# Patient Record
Sex: FEMALE | Race: WHITE | URBAN - NONMETROPOLITAN AREA
[De-identification: names, ages, dates, MRNs, and addresses within clinical notes are randomized per-mention and may not be internally consistent; named-entity substitution may affect disease eponyms.]

---

## 2017-01-13 PROBLEM — R07.9 CHEST PAIN: Status: ACTIVE | Noted: 2017-01-13

## 2017-01-13 PROBLEM — G47.30 SLEEP APNEA: Status: ACTIVE | Noted: 2017-01-13

## 2017-01-13 PROBLEM — B35.4 TINEA CORPORIS: Status: ACTIVE | Noted: 2017-01-13

## 2017-01-13 PROBLEM — M43.6 TORTICOLLIS: Status: ACTIVE | Noted: 2017-01-13

## 2017-01-13 PROBLEM — I10 HYPERTENSION: Status: ACTIVE | Noted: 2017-01-13

## 2017-01-13 PROBLEM — M19.90 OSTEOARTHRITIS: Status: ACTIVE | Noted: 2017-01-13

## 2017-01-13 RX ORDER — OMEPRAZOLE 20 MG/1
20 CAPSULE, DELAYED RELEASE ORAL DAILY
COMMUNITY

## 2017-01-13 RX ORDER — MELOXICAM 15 MG/1
15 TABLET ORAL DAILY
COMMUNITY

## 2017-01-13 RX ORDER — LISINOPRIL AND HYDROCHLOROTHIAZIDE 25; 20 MG/1; MG/1
1 TABLET ORAL DAILY
COMMUNITY

## 2017-01-13 RX ORDER — OXYBUTYNIN CHLORIDE 5 MG/1
5 TABLET ORAL 2 TIMES DAILY
COMMUNITY

## 2017-01-13 RX ORDER — LEVOTHYROXINE SODIUM 0.05 MG/1
50 TABLET ORAL DAILY
COMMUNITY

## 2017-01-13 RX ORDER — SIMVASTATIN 40 MG
40 TABLET ORAL DAILY
COMMUNITY

## 2017-01-13 RX ORDER — TOLTERODINE TARTRATE 1 MG/1
1 TABLET, EXTENDED RELEASE ORAL
COMMUNITY

## 2017-02-28 ENCOUNTER — OFFICE VISIT (OUTPATIENT)
Dept: UROLOGY | Age: 57
End: 2017-02-28
Payer: MEDICARE

## 2017-02-28 VITALS
DIASTOLIC BLOOD PRESSURE: 70 MMHG | HEIGHT: 63 IN | TEMPERATURE: 97.3 F | OXYGEN SATURATION: 98 % | WEIGHT: 178 LBS | HEART RATE: 66 BPM | BODY MASS INDEX: 31.54 KG/M2 | SYSTOLIC BLOOD PRESSURE: 130 MMHG

## 2017-02-28 DIAGNOSIS — N39.3 STRESS INCONTINENCE: ICD-10-CM

## 2017-02-28 DIAGNOSIS — N39.0 ACUTE UTI (URINARY TRACT INFECTION): Primary | ICD-10-CM

## 2017-02-28 LAB
BILIRUBIN, POC: NORMAL
BLOOD URINE, POC: NORMAL
CLARITY, POC: NORMAL
COLOR, POC: NORMAL
GLUCOSE URINE, POC: NORMAL
KETONES, POC: NORMAL
LEUKOCYTE EST, POC: NORMAL
NITRITE, POC: NORMAL
PH, POC: 7
PROTEIN, POC: NORMAL
SPECIFIC GRAVITY, POC: 1.01
UROBILINOGEN, POC: 0.2

## 2017-02-28 PROCEDURE — G8419 CALC BMI OUT NRM PARAM NOF/U: HCPCS | Performed by: PHYSICIAN ASSISTANT

## 2017-02-28 PROCEDURE — 3017F COLORECTAL CA SCREEN DOC REV: CPT | Performed by: PHYSICIAN ASSISTANT

## 2017-02-28 PROCEDURE — 4004F PT TOBACCO SCREEN RCVD TLK: CPT | Performed by: PHYSICIAN ASSISTANT

## 2017-02-28 PROCEDURE — 81002 URINALYSIS NONAUTO W/O SCOPE: CPT | Performed by: PHYSICIAN ASSISTANT

## 2017-02-28 PROCEDURE — 51798 US URINE CAPACITY MEASURE: CPT | Performed by: PHYSICIAN ASSISTANT

## 2017-02-28 PROCEDURE — 99214 OFFICE O/P EST MOD 30 MIN: CPT | Performed by: PHYSICIAN ASSISTANT

## 2017-02-28 PROCEDURE — 3014F SCREEN MAMMO DOC REV: CPT | Performed by: PHYSICIAN ASSISTANT

## 2017-02-28 PROCEDURE — G8427 DOCREV CUR MEDS BY ELIG CLIN: HCPCS | Performed by: PHYSICIAN ASSISTANT

## 2017-02-28 PROCEDURE — G8484 FLU IMMUNIZE NO ADMIN: HCPCS | Performed by: PHYSICIAN ASSISTANT

## 2017-02-28 ASSESSMENT — ENCOUNTER SYMPTOMS
HEMOPTYSIS: 0
SPUTUM PRODUCTION: 0
DOUBLE VISION: 0
VOMITING: 0
PHOTOPHOBIA: 0
BACK PAIN: 0
ORTHOPNEA: 0
ABDOMINAL PAIN: 0

## 2025-07-01 RX ORDER — BLOOD SUGAR DIAGNOSTIC
1 STRIP MISCELLANEOUS SEE ADMIN INSTRUCTIONS
COMMUNITY
Start: 2025-03-10

## 2025-07-01 RX ORDER — LEVOFLOXACIN 250 MG/1
TABLET, FILM COATED ORAL
COMMUNITY
Start: 2025-03-21

## 2025-07-01 RX ORDER — CHLORAL HYDRATE 500 MG
1 CAPSULE ORAL 3 TIMES DAILY
COMMUNITY

## 2025-07-01 RX ORDER — CHOLECALCIFEROL (VITAMIN D3) 25 MCG
25 TABLET ORAL DAILY
COMMUNITY

## 2025-07-01 RX ORDER — LANCETS
EACH MISCELLANEOUS
COMMUNITY
Start: 2025-05-17

## 2025-07-01 RX ORDER — AMLODIPINE BESYLATE 2.5 MG/1
2.5 TABLET ORAL DAILY
COMMUNITY
Start: 2025-06-12

## 2025-07-01 RX ORDER — PREDNISONE 20 MG/1
20 TABLET ORAL DAILY
COMMUNITY

## 2025-07-01 RX ORDER — HYDROCHLOROTHIAZIDE 25 MG/1
25 TABLET ORAL DAILY
COMMUNITY
Start: 2025-06-12

## 2025-07-01 RX ORDER — CEFDINIR 300 MG/1
300 CAPSULE ORAL 2 TIMES DAILY
COMMUNITY

## 2025-07-01 RX ORDER — PANTOPRAZOLE SODIUM 40 MG/1
40 TABLET, DELAYED RELEASE ORAL DAILY
COMMUNITY
Start: 2025-06-04

## 2025-07-01 RX ORDER — PIOGLITAZONE 30 MG/1
30 TABLET ORAL DAILY
COMMUNITY
Start: 2025-01-17 | End: 2025-02-16

## 2025-07-01 RX ORDER — AMITRIPTYLINE HYDROCHLORIDE 10 MG/1
10 TABLET ORAL NIGHTLY
COMMUNITY
Start: 2025-04-25

## 2025-07-01 RX ORDER — METFORMIN HYDROCHLORIDE 500 MG/1
500 TABLET, EXTENDED RELEASE ORAL DAILY
COMMUNITY

## 2025-07-01 RX ORDER — GEMFIBROZIL 600 MG/1
600 TABLET, FILM COATED ORAL 2 TIMES DAILY
COMMUNITY
Start: 2025-06-12

## 2025-07-01 RX ORDER — OMEGA-3S/DHA/EPA/FISH OIL 1000-1400
1 CAPSULE,DELAYED RELEASE (ENTERIC COATED) ORAL DAILY
COMMUNITY

## 2025-07-01 RX ORDER — METFORMIN HYDROCHLORIDE 500 MG/1
500 TABLET, EXTENDED RELEASE ORAL 3 TIMES DAILY
COMMUNITY
Start: 2025-03-31 | End: 2026-04-22

## 2025-07-01 RX ORDER — FLUTICASONE PROPIONATE 50 MCG
2 SPRAY, SUSPENSION (ML) NASAL DAILY
COMMUNITY
End: 2025-07-03

## 2025-07-01 RX ORDER — ASPIRIN 81 MG/1
81 TABLET ORAL DAILY
COMMUNITY

## 2025-07-01 RX ORDER — ALBUTEROL SULFATE 0.83 MG/ML
2.5 SOLUTION RESPIRATORY (INHALATION)
COMMUNITY

## 2025-07-01 RX ORDER — METHOCARBAMOL 750 MG/1
750 TABLET, FILM COATED ORAL 3 TIMES DAILY
COMMUNITY
Start: 2025-04-25

## 2025-07-01 RX ORDER — LACTULOSE 10 G/15ML
10 SOLUTION ORAL
COMMUNITY

## 2025-07-01 RX ORDER — BROMPHENIRAMINE MALEATE, PSEUDOEPHEDRINE HYDROCHLORIDE, AND DEXTROMETHORPHAN HYDROBROMIDE 2; 30; 10 MG/5ML; MG/5ML; MG/5ML
SYRUP ORAL
COMMUNITY
Start: 2025-05-30 | End: 2025-07-03

## 2025-07-01 RX ORDER — ONDANSETRON 4 MG/1
8 TABLET, ORALLY DISINTEGRATING ORAL EVERY 8 HOURS PRN
COMMUNITY

## 2025-07-01 RX ORDER — IPRATROPIUM BROMIDE AND ALBUTEROL SULFATE 2.5; .5 MG/3ML; MG/3ML
3 SOLUTION RESPIRATORY (INHALATION)
COMMUNITY

## 2025-07-01 RX ORDER — FLUTICASONE PROPIONATE 220 UG/1
2 AEROSOL, METERED RESPIRATORY (INHALATION)
COMMUNITY
End: 2025-07-03

## 2025-07-01 RX ORDER — ALBUTEROL SULFATE 90 UG/1
2 INHALANT RESPIRATORY (INHALATION) EVERY 6 HOURS PRN
COMMUNITY

## 2025-07-01 RX ORDER — BLOOD SUGAR DIAGNOSTIC
1 STRIP MISCELLANEOUS DAILY
COMMUNITY
Start: 2025-04-21 | End: 2026-04-22

## 2025-07-01 RX ORDER — GLIPIZIDE 10 MG/1
10 TABLET, FILM COATED, EXTENDED RELEASE ORAL DAILY
COMMUNITY

## 2025-07-01 RX ORDER — MIRTAZAPINE 45 MG/1
45 TABLET, FILM COATED ORAL DAILY
COMMUNITY

## 2025-07-03 ENCOUNTER — OFFICE VISIT (OUTPATIENT)
Dept: PULMONOLOGY | Facility: CLINIC | Age: 65
End: 2025-07-03
Payer: MEDICARE

## 2025-07-03 VITALS
WEIGHT: 150 LBS | OXYGEN SATURATION: 98 % | HEIGHT: 63 IN | SYSTOLIC BLOOD PRESSURE: 147 MMHG | DIASTOLIC BLOOD PRESSURE: 88 MMHG | HEART RATE: 70 BPM | BODY MASS INDEX: 26.58 KG/M2

## 2025-07-03 DIAGNOSIS — Z86.16 HISTORY OF COVID-19: ICD-10-CM

## 2025-07-03 DIAGNOSIS — J30.89 NON-SEASONAL ALLERGIC RHINITIS, UNSPECIFIED TRIGGER: ICD-10-CM

## 2025-07-03 DIAGNOSIS — J44.9 CHRONIC OBSTRUCTIVE PULMONARY DISEASE, UNSPECIFIED COPD TYPE: ICD-10-CM

## 2025-07-03 DIAGNOSIS — R91.1 LUNG NODULE: Primary | ICD-10-CM

## 2025-07-03 DIAGNOSIS — R93.89 ABNORMAL CT OF THE CHEST: ICD-10-CM

## 2025-07-03 DIAGNOSIS — G47.30 SLEEP APNEA, UNSPECIFIED TYPE: ICD-10-CM

## 2025-07-03 DIAGNOSIS — Z72.0 TOBACCO ABUSE: ICD-10-CM

## 2025-07-03 DIAGNOSIS — J98.4 RESTRICTIVE LUNG DISEASE: ICD-10-CM

## 2025-07-03 RX ORDER — SODIUM CHLORIDE 9 MG/ML
40 INJECTION, SOLUTION INTRAVENOUS AS NEEDED
OUTPATIENT
Start: 2025-07-03

## 2025-07-03 RX ORDER — ALBUTEROL SULFATE 90 UG/1
2 INHALANT RESPIRATORY (INHALATION) EVERY 4 HOURS PRN
Qty: 6.7 G | Refills: 5 | Status: SHIPPED | OUTPATIENT
Start: 2025-07-03

## 2025-07-03 RX ORDER — FLUTICASONE PROPIONATE 50 MCG
2 SPRAY, SUSPENSION (ML) NASAL DAILY
Qty: 16 G | Refills: 5 | Status: SHIPPED | OUTPATIENT
Start: 2025-07-03

## 2025-07-03 RX ORDER — SODIUM CHLORIDE 0.9 % (FLUSH) 0.9 %
10 SYRINGE (ML) INJECTION AS NEEDED
OUTPATIENT
Start: 2025-07-03

## 2025-07-03 RX ORDER — LORATADINE 10 MG/1
10 TABLET ORAL DAILY
Qty: 90 TABLET | Refills: 3 | Status: SHIPPED | OUTPATIENT
Start: 2025-07-03

## 2025-07-03 RX ORDER — FLUTICASONE PROPIONATE AND SALMETEROL 250; 50 UG/1; UG/1
1 POWDER RESPIRATORY (INHALATION) 2 TIMES DAILY
Qty: 60 EACH | Refills: 5 | Status: SHIPPED | OUTPATIENT
Start: 2025-07-03

## 2025-07-03 RX ORDER — SODIUM CHLORIDE 0.9 % (FLUSH) 0.9 %
10 SYRINGE (ML) INJECTION EVERY 12 HOURS SCHEDULED
OUTPATIENT
Start: 2025-07-03

## 2025-07-03 NOTE — PROGRESS NOTES
[facility] NEW CONSULT NOTE     Patient: Francesca Toribio      :  1960   Age: 65 y.o.    Date of Service: July 3, 2025      Subjective:   Requesting Physician: Jackie Franklin APRN     Reason for Consultation:    Diagnosis Plan   1. Lung nodule  Case Request    Follow Anesthesia Guidlines / Standing Orders    Follow Anesthesia Guidelines / Protocol    CT Chest Without Contrast    Insert Peripheral IV    Saline Lock & Maintain IV Access    sodium chloride 0.9 % flush 10 mL    sodium chloride 0.9 % flush 10 mL    sodium chloride 0.9 % infusion 40 mL    Case Request    NM PET/CT Skull Base to Mid Thigh    Spirometry with Diffusion Capacity & Lung Volumes      2. Tobacco abuse        3. Sleep apnea, unspecified type  Polysomnography 4 or More Parameters      4. History of COVID-19        5. Chronic obstructive pulmonary disease, unspecified COPD type        6. Non-seasonal allergic rhinitis, unspecified trigger        7. Restrictive lung disease        8. Abnormal CT of the chest             Chief Complaint:     Chief Complaint   Patient presents with    lung nodules       History of present illness: Francesca Toribio is a 65 y.o. female who presents to the office today to be seen for    Diagnosis Plan   1. Lung nodule  Case Request    Follow Anesthesia Guidlines / Standing Orders    Follow Anesthesia Guidelines / Protocol    CT Chest Without Contrast    Insert Peripheral IV    Saline Lock & Maintain IV Access    sodium chloride 0.9 % flush 10 mL    sodium chloride 0.9 % flush 10 mL    sodium chloride 0.9 % infusion 40 mL    Case Request    NM PET/CT Skull Base to Mid Thigh    Spirometry with Diffusion Capacity & Lung Volumes      2. Tobacco abuse        3. Sleep apnea, unspecified type  Polysomnography 4 or More Parameters      4. History of COVID-19        5. Chronic obstructive pulmonary disease, unspecified COPD type        6. Non-seasonal allergic rhinitis, unspecified trigger        7.  Restrictive lung disease        8. Abnormal CT of the chest           Other problems per record.  Patient is a very pleasant middle-aged -American female was seen in the pulmonary clinic for a follow-up visit.  She was seen in the pulmonary clinic for a new diagnosis of enlarging lung nodule right upper lobe.  She was accompanied by her daughter.  .  Patient is an active smoker and continues to smoke half to 1 pack a day.  She lives alone.  She had multiple hospital visits with urinary tract infection and blood pressure related issues in the past and last year was noted to have multiple lung nodules in the CT scan of the chest.  She was advised to get follow-up CT scan of the chest in this year which was done in the [facility] in [place] and later it was read in the [place] and was loaded in the system.  I could only retrieve 2 isolated CT views but not the whole CT series and unable to make a correct diagnosis with the CT.  The CT report mentions the patient has a right upper lobe lung nodule which has doubled in size since last year and concerning for malignancy.  She also had multiple other noncalcified smaller pulmonary nodules which apparently are stable.    Patient also reported having shortness of breath she never had a PFT done and is currently using Flovent and also uses DuoNeb and has a albuterol rescue inhaler but not using it.  The patient is not on any oxygen.  She also reported she has obstructive sleep apnea diagnosed few years ago and had a CPAP at home which she stopped using about a year ago due to claustrophobia and noncompliance.  She has nasal allergy but does not use any allergy medications.  She has history of COVID-19 in the past.    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      User imported and matched from unrecognized problem [Helicobacter-associated Gastritis]    Goiter     Hyperlipemia     Hypothyroidism     Osteoporosis     Sleep apnea     Spine degeneration     Tinea corporis     Tobacco abuse     Torticollis      Past Surgical History:   Procedure Laterality Date    HYSTERECTOMY  1988    total    TUBAL ABDOMINAL LIGATION  1980     Allergies   Allergen Reactions    Atorvastatin Unknown - Low Severity    Ketorolac Unknown - Low Severity    Ketorolac Tromethamine Unknown - Low Severity     rash    Morphine Unknown - Low Severity    Simvastatin Unknown - Low Severity     Current Outpatient Medications   Medication Sig Dispense Refill    Accu-Chek Softclix Lancets lancets       albuterol (PROVENTIL) (2.5 MG/3ML) 0.083% nebulizer solution Take 2.5 mg by nebulization 4 (Four) Times a Day.      albuterol sulfate  (90 Base) MCG/ACT inhaler Inhale 2 puffs Every 6 (Six) Hours As Needed.      amitriptyline (ELAVIL) 10 MG tablet Take 1 tablet by mouth Every Night.      amLODIPine (NORVASC) 2.5 MG tablet Take 1 tablet by mouth Daily.      aspirin 81 MG EC tablet Take 1 tablet by mouth Daily.      cefdinir (OMNICEF) 300 MG capsule Take 1 capsule by mouth 2 (Two) Times a Day.      Cholecalciferol 25 MCG (1000 UT) tablet Take 1 tablet by mouth Daily.      Diclofenac Sodium (VOLTAREN) 1 % gel gel Apply 4 g topically to the appropriate area as directed 4 (Four) Times a Day.      ESCITALOPRAM OXALATE PO Take 20 mg by mouth Daily.      gemfibrozil (LOPID) 600 MG tablet Take 1 tablet by mouth 2 (Two) Times a Day.      glipizide (GLUCOTROL XL) 10 MG 24 hr tablet Take 1 tablet by mouth Daily.      glucose blood (Accu-Chek Soheila Plus) test strip 1 each by Other route Daily.      glucose blood (Accu-Chek Soheila Plus) test strip 1 each by Other route See Admin Instructions.      hydroCHLOROthiazide 25 MG tablet Take 1 tablet by mouth Daily.      ipratropium-albuterol  (DUO-NEB) 0.5-2.5 mg/3 ml nebulizer Take 3 mL by nebulization 4 (Four) Times a Day.      lactulose (Constulose) 10 GM/15ML solution Take 15 mL by mouth.      levoFLOXacin (LEVAQUIN) 250 MG tablet       levothyroxine (SYNTHROID, LEVOTHROID) 50 MCG tablet Take 1 tablet by mouth Daily.      lisinopril-hydrochlorothiazide (PRINZIDE,ZESTORETIC) 20-25 MG per tablet Take 1 tablet by mouth Daily.      meloxicam (MOBIC) 15 MG tablet Take 1 tablet by mouth Daily.      metFORMIN ER (GLUCOPHAGE-XR) 500 MG 24 hr tablet Take 1 tablet by mouth Daily.      metFORMIN ER (GLUCOPHAGE-XR) 500 MG 24 hr tablet Take 1 tablet by mouth 3 (Three) Times a Day.      methocarbamol (ROBAXIN) 750 MG tablet Take 1 tablet by mouth 3 (Three) Times a Day.      mirtazapine (REMERON) 45 MG tablet Take 1 tablet by mouth Daily.      Omega-3 Fatty Acids (Fish Oil Ultra) 1400 MG capsule Take 1 capsule by mouth Daily.      Omega-3 Fatty Acids (fish oil) 1000 MG capsule capsule Take 1 capsule by mouth 3 (Three) Times a Day.      OMEGA-3-ACID ETHYL ESTERS PO Take 1 mg by mouth 3 (Three) Times a Day.      omeprazole (priLOSEC) 20 MG capsule Take 1 capsule by mouth Daily.      ondansetron ODT (ZOFRAN-ODT) 4 MG disintegrating tablet Place 2 tablets on the tongue Every 8 (Eight) Hours As Needed.      oxybutynin (DITROPAN) 5 MG tablet Take 1 tablet by mouth 2 (Two) Times a Day.      pantoprazole (PROTONIX) 40 MG EC tablet Take 1 tablet by mouth Daily.      predniSONE (DELTASONE) 20 MG tablet Take 1 tablet by mouth Daily.      simvastatin (ZOCOR) 40 MG tablet Take 1 tablet by mouth Daily.      tolterodine (DETROL) 1 MG tablet Take 1 tablet by mouth.      albuterol sulfate  (90 Base) MCG/ACT inhaler Inhale 2 puffs Every 4 (Four) Hours As Needed for Wheezing. 6.7 g 5    fluticasone (Flonase Allergy Relief) 50 MCG/ACT nasal spray Administer 2 sprays into the nostril(s) as directed by provider Daily. 16 g 5    Fluticasone-Salmeterol (ADVAIR/WIXELA) 250-50  MCG/ACT DISKUS Inhale 1 puff 2 (Two) Times a Day. 60 each 5    loratadine (CLARITIN) 10 MG tablet Take 1 tablet by mouth Daily. 90 tablet 3     No current facility-administered medications for this visit.     Social History     Socioeconomic History    Marital status:    Tobacco Use    Smoking status: Every Day     Current packs/day: 1.00     Types: Cigarettes   Vaping Use    Vaping status: Never Used   Substance and Sexual Activity    Alcohol use: No    Sexual activity: Defer     Family History   Problem Relation Age of Onset    Heart failure Sister         congestive    Heart failure Brother         congestive     Immunization History   Administered Date(s) Administered    31-influenza Vac Quardvalent Preservativ 10/21/2014, 10/23/2017    COVID-19 (MODERNA) 1st,2nd,3rd Dose Monovalent 04/15/2021, 05/18/2021    FLUAD TRI 65YR+ 10/25/2024    Fluzone  >6mos 12/17/2016    Fluzone (or Fluarix & Flulaval for VFC) >6mos 10/31/2022    Fluzone High-Dose 65+yrs 10/30/2023    Hepatitis B Adult/Adolescent IM 02/21/2014, 04/23/2014    Influenza Seasonal Injectable 01/15/2017    MMR 02/21/2014, 04/23/2014    Pneumococcal Polysaccharide (PPSV23) 12/17/2016    Tdap 10/31/2016       Review of Systems  A complete review of systems is performed and all other systems were reviewed and negative except as noted above in the HPI.  Review of Systems   Constitutional:  Positive for fatigue and unexpected weight loss.   HENT:  Positive for congestion, postnasal drip and sinus pressure.    Eyes: Negative.    Respiratory:  Positive for cough, chest tightness and shortness of breath.    Cardiovascular: Negative.    Gastrointestinal: Negative.    Endocrine: Negative.    Genitourinary: Negative.    Musculoskeletal:  Positive for arthralgias and back pain.   Skin: Negative.    Allergic/Immunologic: Positive for environmental allergies.   Neurological:  Positive for weakness.   Hematological: Negative.    Psychiatric/Behavioral: Negative.   "         Objective:     Vital Signs:  /88   Pulse 70   Ht 160 cm (63\")   Wt 68 kg (150 lb)   SpO2 98% Comment: RA  Breastfeeding No   BMI 26.57 kg/m²     Pulmonary Functions Testing Results:  PFT Values          7/3/2025    10:00   Pre Drug PFT Results   FVC 77   FEV1 83   FEF 25-75% 104   FEV1/FVC 84   Other Tests PFT Results   TLC 85      DLCO 79   D/VAsb 114     Results for orders placed in visit on 25    Spirometry with Diffusion Capacity & Lung Volumes    Narrative  Spirometry with Diffusion Capacity & Lung Volumes    Performed by: Zainab García, RRT  Authorized by: Nicole Negrete MD  Pre Drug % Predicted  FVC: 77%  FEV1: 83%  FEF 25-75%: 104%  FEV1/FVC: 84%  T%  RV: 118%  DLCO: 79%  D/VAsb: 114%    Interpretation  Overall comments:  The above test results are acceptable and replacement by ATS criteria  From analysis of the above procedures patient showed evidence of moderate obstructive airway dysfunction.  This is confirmed by decreasing FEV1 although the FEF 25 to 75% is normal and the FEV1/FVC is 84.  No bronchodilator challenge was done.  The total lung capacity is decreased supporting mild restrictive dysfunction and the residual volume is increased suggesting air trapping consistent with obstructive dysfunction.  The diffusion capacity corrected for alveolar volume is above normal limits    No prior test available for comparison.  Clinical correlation is indicated    Nicole Negrtee MD  Pulmonologist/Intensivist  7/3/2025 12:36 CDT        Physical Exam  General:  Patient is a 65 y.o. middle aged  female. Looks states age. Appears to be in no acute distress.  Eyes:  EOMI.  PERRLA.  Vision intact.  No scleral icterus.    Ear, Nose, Mouth and Throat:  External inspection of the ears and nose appear to be normal.  No obvious scars or lesions.  Hearing is grossly intact.  No leukoplakia, pharyngitis, stomatitis or thrush. Swollen nasal mucosa with post " nasal drip.   Neck:  Range of motion of neck normal.  No thyromegaly or masses. Malanpati Class 3, no jugular venous distention, no thyroid swelling  Respiratory:  Clear to auscultation bilaterally.  No use of accessory muscles. Decreased breath sounds.      Cardiovascular:  Regularly regular rhythm without S3, S4 or murmur.  No hepatojugular reflux nor carotid bruits.  Pulses intact in four extremities.  No obvious signs of edema.    Gastrointestinal:  Nontender, nondistended, soft.  Bowel sounds positive in all four quadrants.  No organomegaly or masses nor bruit.    Lymphatic:  No significant adenopathy appreciated in the neck, axilla or elsewhere.    Musculoskeletal:  Gait was grossly intact.  Range of motion, strength and sensitivity of all four extremities appear to be intact.  Distal tendon reflexes intact.   Skin:  No obvious rashes, lesions, ulcers or large amount of bruising.    Neurological:  Refer to Eye, Ear, Nose and Throat and musculoskeletal exams for additional details.  Distal tendon reflexes intact.  No clonus or Babinski.  Sensation to touch is grossly intact.    Psychiatric:  Patient is alert and oriented to person, place and time.  Recent and remote memory appear to be intact.  Patient does not show outward signs of depression.      Diagnostic Findings:   Pertinent findings noted and abnormal findings also noted. Reviewed     Chest Imaging    Results  CT Chest Without Contrast Diagnostic (Order [id number])     CT Chest Without Contrast Diagnostic  Order: [id number]  Impression    IMPRESSION:    1. Enlarging 9 mm solid right upper lobe nodule most consistent with  bronchogenic carcinoma.  2. Numerous small groundglass opacities and small nodular opacities  with interval enlargement of a semisolid opacity within the left lung  apex also likely a very indolent adenocarcinoma.        Certain findings detected within the thorax (chest) on this exam  warrant followup, as per published guidelines.  Additional evaluation  of these findings is recommended.  Narrative    HISTORY:  Pulmonary nodule follow-up    COMPARISON:  June 2024    TECHNIQUE:  And December 2016 For this CT exam, one or more of the following dose  reduction techniques was employed:  automated exposure control  mA and/or kVp adjustment for patient size  iterative reconstruction      FINDINGS:  No axillary, hilar, mediastinal, or retrocrural adenopathy. Thoracic  aorta demonstrates mild noncalcified plaque throughout but is normal  in caliber. Main pulmonary artery normal in size. Heart is normal in  size. Moderate coronary calcium occasions. Pericardium is normal in  thickness. No pericardial effusion.    No pleural effusions or masses are present.    Numerous small solid and groundglass nodules within the upper lobes  bilaterally likely reflecting some element of smoking-related  respiratory bronchiolitis. However a few of the groundglass opacities  have increased in size. The solid nodule within the posterior segment  right upper lobe has increased in size now measuring 9 mm x 8 mm,  previously measuring 4 mm June 2024. The appearance is most consistent  with bronchogenic carcinoma. There is an enlarging groundglass opacity  dependently within the apical posterior segment left upper lobe,  previously measuring 5-6 mm December 2016 and currently measuring  approximately 9 mm by 13 mm. Very small solid component which has  likely increased since reference study dated June 2024. However the  solid component likely only measures a few millimeters. Central  tracheobronchial tree unremarkable. No bronchiectasis or fibrotic  interstitial lung disease.    Incidental imaging of the upper abdomen is unremarkable. Gallbladder  surgically absent. No abnormality of the adrenal glands or liver.    No lytic or blastic bony lesion.  Images on Order [id number]    Exam End: 06/20/25 16:46    Specimen Collected: 06/20/25 16:57 Last Resulted: 06/20/25 17:07    Received From: [facility]      Assessment      1. Lung nodule    2. Tobacco abuse    3. Sleep apnea, unspecified type    4. History of COVID-19    5. Chronic obstructive pulmonary disease, unspecified COPD type    6. Non-seasonal allergic rhinitis, unspecified trigger    7. Restrictive lung disease    8. Abnormal CT of the chest        Plan/Recommendations     1.  I reviewed the CT scan reports from the isolated 2 films from outside hospital but according to the CT reports the patient has a right-sided enlarging lung nodule which is concerning for malignancy and due to her smoking history she definitely will need a workup for a tissue diagnosis and due to location of the nodule and an robotic navigational bronchoscopy is recommended and is scheduled with Dr. Wright in 2 weeks time.  2.  Prebronchoscopy orders were placed.  Navigational bronchoscopy has been ordered.  The patient also will have a PET scan and a PFT done in the clinic today showed she has moderate obstructive airway dysfunction and mild restrictive dysfunction with normal diffusion Calcicard for alveolar volume.  I discussed the CT results and the PFT results with the patient and requested the outside hospital to send a full CT imaging link to the [facility] system in [facility].  3.  Patient is currently using Flovent which is replaced with Advair and she will continue DuoNeb and albuterol rescue  Prescriptions were sent to the pharmacy.  4.  She has nasal allergy and she will be started on fluticasone nasal spray and loratadine and prescription was sent to the pharmacy.  5.  Patient has obstructive sleep apnea which is untreated and she used CPAP in the past but will need a new sleep study will need to start her on CPAP when results are available.  She will return to the pulmonary clinic in a month's time after the biopsy is done to discuss the test results and further treatment planning  6. Francesca Toribio  reports  that she has been smoking cigarettes. She does not have any smokeless tobacco history on file. I have educated her on the risk of diseases from using tobacco products such as cancer, COPD, and heart disease. I advised her to quit and she is willing to quit. We have discussed the following method/s for tobacco cessation:  Counseling.  Together we have set a quit date for 2 weeks from today.  She will follow up with me in 30 days or sooner to check on her progress.I spent 7 minutes counseling the patient.    Follow Up    1 months    Time Spent   45 minutes      I appreciate the opportunity of participating in this patients care. I would like to thank the PCP for the referral. Please feel free to contact me with any other questions.       Nicole Negrete MD   Pulmonologist/Intensivist     12:41 CDT

## 2025-07-03 NOTE — H&P (VIEW-ONLY)
[facility] NEW CONSULT NOTE     Patient: Francesca Toribio      :  1960   Age: 65 y.o.    Date of Service: July 3, 2025      Subjective:   Requesting Physician: Jackie Franklin APRN     Reason for Consultation:    Diagnosis Plan   1. Lung nodule  Case Request    Follow Anesthesia Guidlines / Standing Orders    Follow Anesthesia Guidelines / Protocol    CT Chest Without Contrast    Insert Peripheral IV    Saline Lock & Maintain IV Access    sodium chloride 0.9 % flush 10 mL    sodium chloride 0.9 % flush 10 mL    sodium chloride 0.9 % infusion 40 mL    Case Request    NM PET/CT Skull Base to Mid Thigh    Spirometry with Diffusion Capacity & Lung Volumes      2. Tobacco abuse        3. Sleep apnea, unspecified type  Polysomnography 4 or More Parameters      4. History of COVID-19        5. Chronic obstructive pulmonary disease, unspecified COPD type        6. Non-seasonal allergic rhinitis, unspecified trigger        7. Restrictive lung disease        8. Abnormal CT of the chest             Chief Complaint:     Chief Complaint   Patient presents with    lung nodules       History of present illness: Francesca Toribio is a 65 y.o. female who presents to the office today to be seen for    Diagnosis Plan   1. Lung nodule  Case Request    Follow Anesthesia Guidlines / Standing Orders    Follow Anesthesia Guidelines / Protocol    CT Chest Without Contrast    Insert Peripheral IV    Saline Lock & Maintain IV Access    sodium chloride 0.9 % flush 10 mL    sodium chloride 0.9 % flush 10 mL    sodium chloride 0.9 % infusion 40 mL    Case Request    NM PET/CT Skull Base to Mid Thigh    Spirometry with Diffusion Capacity & Lung Volumes      2. Tobacco abuse        3. Sleep apnea, unspecified type  Polysomnography 4 or More Parameters      4. History of COVID-19        5. Chronic obstructive pulmonary disease, unspecified COPD type        6. Non-seasonal allergic rhinitis, unspecified trigger        7.  Restrictive lung disease        8. Abnormal CT of the chest           Other problems per record.  Patient is a very pleasant middle-aged -American female was seen in the pulmonary clinic for a follow-up visit.  She was seen in the pulmonary clinic for a new diagnosis of enlarging lung nodule right upper lobe.  She was accompanied by her daughter.  .  Patient is an active smoker and continues to smoke half to 1 pack a day.  She lives alone.  She had multiple hospital visits with urinary tract infection and blood pressure related issues in the past and last year was noted to have multiple lung nodules in the CT scan of the chest.  She was advised to get follow-up CT scan of the chest in this year which was done in the [facility] in [facility] in [place] and later it was read in the [facility] and was loaded in the system.  I could only retrieve 2 isolated CT views but not the whole CT series and unable to make a correct diagnosis with the CT.  The CT report mentions the patient has a right upper lobe lung nodule which has doubled in size since last year and concerning for malignancy.  She also had multiple other noncalcified smaller pulmonary nodules which apparently are stable.    Patient also reported having shortness of breath she never had a PFT done and is currently using Flovent and also uses DuoNeb and has a albuterol rescue inhaler but not using it.  The patient is not on any oxygen.  She also reported she has obstructive sleep apnea diagnosed few years ago and had a CPAP at home which she stopped using about a year ago due to claustrophobia and noncompliance.  She has nasal allergy but does not use any allergy medications.  She has history of COVID-19 in the past.    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      User imported and matched from unrecognized problem [Helicobacter-associated Gastritis]    Goiter     Hyperlipemia     Hypothyroidism     Osteoporosis     Sleep apnea     Spine degeneration     Tinea corporis     Tobacco abuse     Torticollis      Past Surgical History:   Procedure Laterality Date    HYSTERECTOMY  1988    total    TUBAL ABDOMINAL LIGATION  1980     Allergies   Allergen Reactions    Atorvastatin Unknown - Low Severity    Ketorolac Unknown - Low Severity    Ketorolac Tromethamine Unknown - Low Severity     rash    Morphine Unknown - Low Severity    Simvastatin Unknown - Low Severity     Current Outpatient Medications   Medication Sig Dispense Refill    Accu-Chek Softclix Lancets lancets       albuterol (PROVENTIL) (2.5 MG/3ML) 0.083% nebulizer solution Take 2.5 mg by nebulization 4 (Four) Times a Day.      albuterol sulfate  (90 Base) MCG/ACT inhaler Inhale 2 puffs Every 6 (Six) Hours As Needed.      amitriptyline (ELAVIL) 10 MG tablet Take 1 tablet by mouth Every Night.      amLODIPine (NORVASC) 2.5 MG tablet Take 1 tablet by mouth Daily.      aspirin 81 MG EC tablet Take 1 tablet by mouth Daily.      cefdinir (OMNICEF) 300 MG capsule Take 1 capsule by mouth 2 (Two) Times a Day.      Cholecalciferol 25 MCG (1000 UT) tablet Take 1 tablet by mouth Daily.      Diclofenac Sodium (VOLTAREN) 1 % gel gel Apply 4 g topically to the appropriate area as directed 4 (Four) Times a Day.      ESCITALOPRAM OXALATE PO Take 20 mg by mouth Daily.      gemfibrozil (LOPID) 600 MG tablet Take 1 tablet by mouth 2 (Two) Times a Day.      glipizide (GLUCOTROL XL) 10 MG 24 hr tablet Take 1 tablet by mouth Daily.      glucose blood (Accu-Chek Soheila Plus) test strip 1 each by Other route Daily.      glucose blood (Accu-Chek Soheila Plus) test strip 1 each by Other route See Admin Instructions.      hydroCHLOROthiazide 25 MG tablet Take 1 tablet by mouth Daily.      ipratropium-albuterol  (DUO-NEB) 0.5-2.5 mg/3 ml nebulizer Take 3 mL by nebulization 4 (Four) Times a Day.      lactulose (Constulose) 10 GM/15ML solution Take 15 mL by mouth.      levoFLOXacin (LEVAQUIN) 250 MG tablet       levothyroxine (SYNTHROID, LEVOTHROID) 50 MCG tablet Take 1 tablet by mouth Daily.      lisinopril-hydrochlorothiazide (PRINZIDE,ZESTORETIC) 20-25 MG per tablet Take 1 tablet by mouth Daily.      meloxicam (MOBIC) 15 MG tablet Take 1 tablet by mouth Daily.      metFORMIN ER (GLUCOPHAGE-XR) 500 MG 24 hr tablet Take 1 tablet by mouth Daily.      metFORMIN ER (GLUCOPHAGE-XR) 500 MG 24 hr tablet Take 1 tablet by mouth 3 (Three) Times a Day.      methocarbamol (ROBAXIN) 750 MG tablet Take 1 tablet by mouth 3 (Three) Times a Day.      mirtazapine (REMERON) 45 MG tablet Take 1 tablet by mouth Daily.      Omega-3 Fatty Acids (Fish Oil Ultra) 1400 MG capsule Take 1 capsule by mouth Daily.      Omega-3 Fatty Acids (fish oil) 1000 MG capsule capsule Take 1 capsule by mouth 3 (Three) Times a Day.      OMEGA-3-ACID ETHYL ESTERS PO Take 1 mg by mouth 3 (Three) Times a Day.      omeprazole (priLOSEC) 20 MG capsule Take 1 capsule by mouth Daily.      ondansetron ODT (ZOFRAN-ODT) 4 MG disintegrating tablet Place 2 tablets on the tongue Every 8 (Eight) Hours As Needed.      oxybutynin (DITROPAN) 5 MG tablet Take 1 tablet by mouth 2 (Two) Times a Day.      pantoprazole (PROTONIX) 40 MG EC tablet Take 1 tablet by mouth Daily.      predniSONE (DELTASONE) 20 MG tablet Take 1 tablet by mouth Daily.      simvastatin (ZOCOR) 40 MG tablet Take 1 tablet by mouth Daily.      tolterodine (DETROL) 1 MG tablet Take 1 tablet by mouth.      albuterol sulfate  (90 Base) MCG/ACT inhaler Inhale 2 puffs Every 4 (Four) Hours As Needed for Wheezing. 6.7 g 5    fluticasone (Flonase Allergy Relief) 50 MCG/ACT nasal spray Administer 2 sprays into the nostril(s) as directed by provider Daily. 16 g 5    Fluticasone-Salmeterol (ADVAIR/WIXELA) 250-50  MCG/ACT DISKUS Inhale 1 puff 2 (Two) Times a Day. 60 each 5    loratadine (CLARITIN) 10 MG tablet Take 1 tablet by mouth Daily. 90 tablet 3     No current facility-administered medications for this visit.     Social History     Socioeconomic History    Marital status:    Tobacco Use    Smoking status: Every Day     Current packs/day: 1.00     Types: Cigarettes   Vaping Use    Vaping status: Never Used   Substance and Sexual Activity    Alcohol use: No    Sexual activity: Defer     Family History   Problem Relation Age of Onset    Heart failure Sister         congestive    Heart failure Brother         congestive     Immunization History   Administered Date(s) Administered    31-influenza Vac Quardvalent Preservativ 10/21/2014, 10/23/2017    COVID-19 (MODERNA) 1st,2nd,3rd Dose Monovalent 04/15/2021, 05/18/2021    FLUAD TRI 65YR+ 10/25/2024    Fluzone  >6mos 12/17/2016    Fluzone (or Fluarix & Flulaval for VFC) >6mos 10/31/2022    Fluzone High-Dose 65+yrs 10/30/2023    Hepatitis B Adult/Adolescent IM 02/21/2014, 04/23/2014    Influenza Seasonal Injectable 01/15/2017    MMR 02/21/2014, 04/23/2014    Pneumococcal Polysaccharide (PPSV23) 12/17/2016    Tdap 10/31/2016       Review of Systems  A complete review of systems is performed and all other systems were reviewed and negative except as noted above in the HPI.  Review of Systems   Constitutional:  Positive for fatigue and unexpected weight loss.   HENT:  Positive for congestion, postnasal drip and sinus pressure.    Eyes: Negative.    Respiratory:  Positive for cough, chest tightness and shortness of breath.    Cardiovascular: Negative.    Gastrointestinal: Negative.    Endocrine: Negative.    Genitourinary: Negative.    Musculoskeletal:  Positive for arthralgias and back pain.   Skin: Negative.    Allergic/Immunologic: Positive for environmental allergies.   Neurological:  Positive for weakness.   Hematological: Negative.    Psychiatric/Behavioral: Negative.   "         Objective:     Vital Signs:  /88   Pulse 70   Ht 160 cm (63\")   Wt 68 kg (150 lb)   SpO2 98% Comment: RA  Breastfeeding No   BMI 26.57 kg/m²     Pulmonary Functions Testing Results:  PFT Values          7/3/2025    10:00   Pre Drug PFT Results   FVC 77   FEV1 83   FEF 25-75% 104   FEV1/FVC 84   Other Tests PFT Results   TLC 85      DLCO 79   D/VAsb 114     Results for orders placed in visit on 25    Spirometry with Diffusion Capacity & Lung Volumes    Narrative  Spirometry with Diffusion Capacity & Lung Volumes    Performed by: Zainab García, RRT  Authorized by: Nicole Negrete MD  Pre Drug % Predicted  FVC: 77%  FEV1: 83%  FEF 25-75%: 104%  FEV1/FVC: 84%  T%  RV: 118%  DLCO: 79%  D/VAsb: 114%    Interpretation  Overall comments:  The above test results are acceptable and replacement by ATS criteria  From analysis of the above procedures patient showed evidence of moderate obstructive airway dysfunction.  This is confirmed by decreasing FEV1 although the FEF 25 to 75% is normal and the FEV1/FVC is 84.  No bronchodilator challenge was done.  The total lung capacity is decreased supporting mild restrictive dysfunction and the residual volume is increased suggesting air trapping consistent with obstructive dysfunction.  The diffusion capacity corrected for alveolar volume is above normal limits    No prior test available for comparison.  Clinical correlation is indicated    Nicole Negrete MD  Pulmonologist/Intensivist  7/3/2025 12:36 CDT        Physical Exam  General:  Patient is a 65 y.o. middle aged  female. Looks states age. Appears to be in no acute distress.  Eyes:  EOMI.  PERRLA.  Vision intact.  No scleral icterus.    Ear, Nose, Mouth and Throat:  External inspection of the ears and nose appear to be normal.  No obvious scars or lesions.  Hearing is grossly intact.  No leukoplakia, pharyngitis, stomatitis or thrush. Swollen nasal mucosa with post " nasal drip.   Neck:  Range of motion of neck normal.  No thyromegaly or masses. Malanpati Class 3, no jugular venous distention, no thyroid swelling  Respiratory:  Clear to auscultation bilaterally.  No use of accessory muscles. Decreased breath sounds.      Cardiovascular:  Regularly regular rhythm without S3, S4 or murmur.  No hepatojugular reflux nor carotid bruits.  Pulses intact in four extremities.  No obvious signs of edema.    Gastrointestinal:  Nontender, nondistended, soft.  Bowel sounds positive in all four quadrants.  No organomegaly or masses nor bruit.    Lymphatic:  No significant adenopathy appreciated in the neck, axilla or elsewhere.    Musculoskeletal:  Gait was grossly intact.  Range of motion, strength and sensitivity of all four extremities appear to be intact.  Distal tendon reflexes intact.   Skin:  No obvious rashes, lesions, ulcers or large amount of bruising.    Neurological:  Refer to Eye, Ear, Nose and Throat and musculoskeletal exams for additional details.  Distal tendon reflexes intact.  No clonus or Babinski.  Sensation to touch is grossly intact.    Psychiatric:  Patient is alert and oriented to person, place and time.  Recent and remote memory appear to be intact.  Patient does not show outward signs of depression.      Diagnostic Findings:   Pertinent findings noted and abnormal findings also noted. Reviewed     Chest Imaging    Results  CT Chest Without Contrast Diagnostic (Order [id number])     CT Chest Without Contrast Diagnostic  Order: [id number]  Impression    IMPRESSION:    1. Enlarging 9 mm solid right upper lobe nodule most consistent with  bronchogenic carcinoma.  2. Numerous small groundglass opacities and small nodular opacities  with interval enlargement of a semisolid opacity within the left lung  apex also likely a very indolent adenocarcinoma.        Certain findings detected within the thorax (chest) on this exam  warrant followup, as per published guidelines.  Additional evaluation  of these findings is recommended.  Narrative    HISTORY:  Pulmonary nodule follow-up    COMPARISON:  June 2024    TECHNIQUE:  And December 2016 For this CT exam, one or more of the following dose  reduction techniques was employed:  automated exposure control  mA and/or kVp adjustment for patient size  iterative reconstruction      FINDINGS:  No axillary, hilar, mediastinal, or retrocrural adenopathy. Thoracic  aorta demonstrates mild noncalcified plaque throughout but is normal  in caliber. Main pulmonary artery normal in size. Heart is normal in  size. Moderate coronary calcium occasions. Pericardium is normal in  thickness. No pericardial effusion.    No pleural effusions or masses are present.    Numerous small solid and groundglass nodules within the upper lobes  bilaterally likely reflecting some element of smoking-related  respiratory bronchiolitis. However a few of the groundglass opacities  have increased in size. The solid nodule within the posterior segment  right upper lobe has increased in size now measuring 9 mm x 8 mm,  previously measuring 4 mm June 2024. The appearance is most consistent  with bronchogenic carcinoma. There is an enlarging groundglass opacity  dependently within the apical posterior segment left upper lobe,  previously measuring 5-6 mm December 2016 and currently measuring  approximately 9 mm by 13 mm. Very small solid component which has  likely increased since reference study dated June 2024. However the  solid component likely only measures a few millimeters. Central  tracheobronchial tree unremarkable. No bronchiectasis or fibrotic  interstitial lung disease.    Incidental imaging of the upper abdomen is unremarkable. Gallbladder  surgically absent. No abnormality of the adrenal glands or liver.    No lytic or blastic bony lesion.  Images on Order [id number]    Exam End: 06/20/25 16:46    Specimen Collected: 06/20/25 16:57 Last Resulted: 06/20/25 17:07    Received From: [facility]      Assessment      1. Lung nodule    2. Tobacco abuse    3. Sleep apnea, unspecified type    4. History of COVID-19    5. Chronic obstructive pulmonary disease, unspecified COPD type    6. Non-seasonal allergic rhinitis, unspecified trigger    7. Restrictive lung disease    8. Abnormal CT of the chest        Plan/Recommendations     1.  I reviewed the CT scan reports from the isolated 2 films from outside hospital but according to the CT reports the patient has a right-sided enlarging lung nodule which is concerning for malignancy and due to her smoking history she definitely will need a workup for a tissue diagnosis and due to location of the nodule and an robotic navigational bronchoscopy is recommended and is scheduled with Dr. Wright in 2 weeks time.  2.  Prebronchoscopy orders were placed.  Navigational bronchoscopy has been ordered.  The patient also will have a PET scan and a PFT done in the clinic today showed she has moderate obstructive airway dysfunction and mild restrictive dysfunction with normal diffusion Calcicard for alveolar volume.  I discussed the CT results and the PFT results with the patient and requested the outside hospital to send a full CT imaging link to the [employer] system in [facility].  3.  Patient is currently using Flovent which is replaced with Advair and she will continue DuoNeb and albuterol rescue  Prescriptions were sent to the pharmacy.  4.  She has nasal allergy and she will be started on fluticasone nasal spray and loratadine and prescription was sent to the pharmacy.  5.  Patient has obstructive sleep apnea which is untreated and she used CPAP in the past but will need a new sleep study will need to start her on CPAP when results are available.  She will return to the pulmonary clinic in a month's time after the biopsy is done to discuss the test results and further treatment planning  6. Francesca Toribio  reports  that she has been smoking cigarettes. She does not have any smokeless tobacco history on file. I have educated her on the risk of diseases from using tobacco products such as cancer, COPD, and heart disease. I advised her to quit and she is willing to quit. We have discussed the following method/s for tobacco cessation:  Counseling.  Together we have set a quit date for 2 weeks from today.  She will follow up with me in 30 days or sooner to check on her progress.I spent 7 minutes counseling the patient.    Follow Up    1 months    Time Spent   45 minutes      I appreciate the opportunity of participating in this patients care. I would like to thank the PCP for the referral. Please feel free to contact me with any other questions.       Nicole Negrete MD   Pulmonologist/Intensivist     12:41 CDT

## 2025-07-03 NOTE — PROCEDURES
Spirometry with Diffusion Capacity & Lung Volumes    Performed by: Zainab Gacría, RRT  Authorized by: Nicole Negrete MD     Pre Drug % Predicted    FVC: 77%   FEV1: 83%   FEF 25-75%: 104%   FEV1/FVC: 84%   T%   RV: 118%   DLCO: 79%   D/VAsb: 114%    Interpretation   Overall comments:   The above test results are acceptable and replacement by ATS criteria  From analysis of the above procedures patient showed evidence of moderate obstructive airway dysfunction.  This is confirmed by decreasing FEV1 although the FEF 25 to 75% is normal and the FEV1/FVC is 84.  No bronchodilator challenge was done.  The total lung capacity is decreased supporting mild restrictive dysfunction and the residual volume is increased suggesting air trapping consistent with obstructive dysfunction.  The diffusion capacity corrected for alveolar volume is above normal limits    No prior test available for comparison.  Clinical correlation is indicated    Nicole Negrete MD  Pulmonologist/Intensivist  7/3/2025 12:36 CDT

## 2025-07-07 ENCOUNTER — TELEPHONE (OUTPATIENT)
Dept: PULMONOLOGY | Facility: CLINIC | Age: 65
End: 2025-07-07
Payer: MEDICARE

## 2025-07-09 ENCOUNTER — TELEPHONE (OUTPATIENT)
Dept: PULMONOLOGY | Facility: CLINIC | Age: 65
End: 2025-07-09
Payer: MEDICARE

## 2025-07-10 ENCOUNTER — HOSPITAL ENCOUNTER (OUTPATIENT)
Dept: CT IMAGING | Facility: HOSPITAL | Age: 65
End: 2025-07-10
Payer: MEDICARE

## 2025-07-10 ENCOUNTER — HOSPITAL ENCOUNTER (OUTPATIENT)
Dept: CT IMAGING | Facility: HOSPITAL | Age: 65
Discharge: HOME | End: 2025-07-10
Payer: MEDICARE

## 2025-07-10 ENCOUNTER — PRE-ADMISSION TESTING (OUTPATIENT)
Dept: PREADMISSION TESTING | Facility: HOSPITAL | Age: 65
End: 2025-07-10
Payer: MEDICARE

## 2025-07-10 VITALS
DIASTOLIC BLOOD PRESSURE: 73 MMHG | OXYGEN SATURATION: 97 % | RESPIRATION RATE: 16 BRPM | WEIGHT: 158.95 LBS | SYSTOLIC BLOOD PRESSURE: 142 MMHG | HEART RATE: 91 BPM | BODY MASS INDEX: 29.25 KG/M2 | HEIGHT: 62 IN

## 2025-07-10 DIAGNOSIS — R91.1 LUNG NODULE: ICD-10-CM

## 2025-07-10 LAB
ANION GAP SERPL CALCULATED.3IONS-SCNC: 18 MMOL/L (ref 5–15)
BUN SERPL-MCNC: 19.2 MG/DL (ref 8–23)
BUN/CREAT SERPL: 15 (ref 7–25)
CALCIUM SPEC-SCNC: 10 MG/DL (ref 8.6–10.5)
CHLORIDE SERPL-SCNC: 98 MMOL/L (ref 98–107)
CO2 SERPL-SCNC: 24 MMOL/L (ref 22–29)
CREAT SERPL-MCNC: 1.28 MG/DL (ref 0.57–1)
DEPRECATED RDW RBC AUTO: 41.5 FL (ref 37–54)
EGFRCR SERPLBLD CKD-EPI 2021: 46 ML/MIN/1.73
ERYTHROCYTE [DISTWIDTH] IN BLOOD BY AUTOMATED COUNT: 13.1 % (ref 12.3–15.4)
GLUCOSE SERPL-MCNC: 166 MG/DL (ref 65–99)
HCT VFR BLD AUTO: 41.1 % (ref 34–46.6)
HGB BLD-MCNC: 13.9 G/DL (ref 12–15.9)
MCH RBC QN AUTO: 29.4 PG (ref 26.6–33)
MCHC RBC AUTO-ENTMCNC: 33.8 G/DL (ref 31.5–35.7)
MCV RBC AUTO: 87.1 FL (ref 79–97)
PLATELET # BLD AUTO: 274 10*3/MM3 (ref 140–450)
PMV BLD AUTO: 11 FL (ref 6–12)
POTASSIUM SERPL-SCNC: 3.4 MMOL/L (ref 3.5–5.2)
RBC # BLD AUTO: 4.72 10*6/MM3 (ref 3.77–5.28)
SODIUM SERPL-SCNC: 140 MMOL/L (ref 136–145)
WBC NRBC COR # BLD AUTO: 9.08 10*3/MM3 (ref 3.4–10.8)

## 2025-07-10 PROCEDURE — 80048 BASIC METABOLIC PNL TOTAL CA: CPT

## 2025-07-10 PROCEDURE — 36415 COLL VENOUS BLD VENIPUNCTURE: CPT

## 2025-07-10 PROCEDURE — 93010 ELECTROCARDIOGRAM REPORT: CPT | Performed by: INTERNAL MEDICINE

## 2025-07-10 PROCEDURE — 85027 COMPLETE CBC AUTOMATED: CPT

## 2025-07-10 PROCEDURE — 93005 ELECTROCARDIOGRAM TRACING: CPT

## 2025-07-10 NOTE — DISCHARGE INSTRUCTIONS

## 2025-07-11 ENCOUNTER — HOSPITAL ENCOUNTER (OUTPATIENT)
Dept: CT IMAGING | Facility: HOSPITAL | Age: 65
Discharge: HOME | End: 2025-07-11
Payer: MEDICARE

## 2025-07-11 LAB
GLUCOSE BLDC GLUCOMTR-MCNC: 114 MG/DL (ref 70–130)
QT INTERVAL: 402 MS
QTC INTERVAL: 481 MS

## 2025-07-11 PROCEDURE — 82948 REAGENT STRIP/BLOOD GLUCOSE: CPT

## 2025-07-11 PROCEDURE — 34310000005 FLUDEOXYGLUCOSE F18 SOLUTION: Performed by: INTERNAL MEDICINE

## 2025-07-11 PROCEDURE — 78815 PET IMAGE W/CT SKULL-THIGH: CPT

## 2025-07-11 PROCEDURE — 71250 CT THORAX DX C-: CPT

## 2025-07-11 PROCEDURE — A9552 F18 FDG: HCPCS | Performed by: INTERNAL MEDICINE

## 2025-07-11 RX ADMIN — FLUDEOXYGLUCOSE F18 1 DOSE: 300 INJECTION INTRAVENOUS at 12:52

## 2025-07-14 ENCOUNTER — RESULTS FOLLOW-UP (OUTPATIENT)
Dept: PULMONOLOGY | Facility: CLINIC | Age: 65
End: 2025-07-14
Payer: MEDICARE

## 2025-07-17 ENCOUNTER — HOSPITAL ENCOUNTER (OUTPATIENT)
Facility: HOSPITAL | Age: 65
Setting detail: HOSPITAL OUTPATIENT SURGERY
Discharge: HOME | End: 2025-07-17
Attending: INTERNAL MEDICINE | Admitting: INTERNAL MEDICINE
Payer: MEDICARE

## 2025-07-17 ENCOUNTER — APPOINTMENT (OUTPATIENT)
Dept: GENERAL RADIOLOGY | Facility: HOSPITAL | Age: 65
End: 2025-07-17
Payer: MEDICARE

## 2025-07-17 ENCOUNTER — ANESTHESIA (OUTPATIENT)
Dept: PERIOP | Facility: HOSPITAL | Age: 65
End: 2025-07-17
Payer: MEDICARE

## 2025-07-17 ENCOUNTER — ANESTHESIA EVENT (OUTPATIENT)
Dept: PERIOP | Facility: HOSPITAL | Age: 65
End: 2025-07-17
Payer: MEDICARE

## 2025-07-17 VITALS
BODY MASS INDEX: 29.25 KG/M2 | HEIGHT: 62 IN | OXYGEN SATURATION: 98 % | RESPIRATION RATE: 18 BRPM | DIASTOLIC BLOOD PRESSURE: 62 MMHG | SYSTOLIC BLOOD PRESSURE: 119 MMHG | TEMPERATURE: 97.9 F | HEART RATE: 75 BPM | WEIGHT: 158.95 LBS

## 2025-07-17 DIAGNOSIS — R91.1 LUNG NODULE: ICD-10-CM

## 2025-07-17 LAB
GLUCOSE BLDC GLUCOMTR-MCNC: 127 MG/DL (ref 70–130)
GLUCOSE BLDC GLUCOMTR-MCNC: 133 MG/DL (ref 70–130)
KOH PREP NAIL: NORMAL
LYMPHOCYTES NFR FLD MANUAL: 19 %
NEUTROPHILS NFR FLD MANUAL: 32 %
UNCLASSIFIED CELLS, FLUID %: 49 %

## 2025-07-17 PROCEDURE — 25010000002 LIDOCAINE PF 2% 2 % SOLUTION

## 2025-07-17 PROCEDURE — C1726 CATH, BAL DIL, NON-VASCULAR: HCPCS | Performed by: INTERNAL MEDICINE

## 2025-07-17 PROCEDURE — 87071 CULTURE AEROBIC QUANT OTHER: CPT | Performed by: INTERNAL MEDICINE

## 2025-07-17 PROCEDURE — 88112 CYTOPATH CELL ENHANCE TECH: CPT | Performed by: INTERNAL MEDICINE

## 2025-07-17 PROCEDURE — 31652 BRONCH EBUS SAMPLNG 1/2 NODE: CPT | Performed by: INTERNAL MEDICINE

## 2025-07-17 PROCEDURE — 76000 FLUOROSCOPY <1 HR PHYS/QHP: CPT

## 2025-07-17 PROCEDURE — 25010000002 SUGAMMADEX 200 MG/2ML SOLUTION: Performed by: NURSE ANESTHETIST, CERTIFIED REGISTERED

## 2025-07-17 PROCEDURE — 31624 DX BRONCHOSCOPE/LAVAGE: CPT | Performed by: INTERNAL MEDICINE

## 2025-07-17 PROCEDURE — 25010000002 DEXAMETHASONE PER 1 MG

## 2025-07-17 PROCEDURE — 89051 BODY FLUID CELL COUNT: CPT | Performed by: INTERNAL MEDICINE

## 2025-07-17 PROCEDURE — 25810000003 LACTATED RINGERS PER 1000 ML: Performed by: INTERNAL MEDICINE

## 2025-07-17 PROCEDURE — 88177 CYTP FNA EVAL EA ADDL: CPT | Performed by: INTERNAL MEDICINE

## 2025-07-17 PROCEDURE — 87220 TISSUE EXAM FOR FUNGI: CPT | Performed by: INTERNAL MEDICINE

## 2025-07-17 PROCEDURE — 31627 NAVIGATIONAL BRONCHOSCOPY: CPT | Performed by: INTERNAL MEDICINE

## 2025-07-17 PROCEDURE — 25010000002 FENTANYL CITRATE (PF) 50 MCG/ML SOLUTION: Performed by: ANESTHESIOLOGY

## 2025-07-17 PROCEDURE — 71045 X-RAY EXAM CHEST 1 VIEW: CPT

## 2025-07-17 PROCEDURE — 88305 TISSUE EXAM BY PATHOLOGIST: CPT | Performed by: INTERNAL MEDICINE

## 2025-07-17 PROCEDURE — 31654 BRONCH EBUS IVNTJ PERPH LES: CPT | Performed by: INTERNAL MEDICINE

## 2025-07-17 PROCEDURE — 25010000002 ONDANSETRON PER 1 MG

## 2025-07-17 PROCEDURE — 87116 MYCOBACTERIA CULTURE: CPT | Performed by: INTERNAL MEDICINE

## 2025-07-17 PROCEDURE — 87102 FUNGUS ISOLATION CULTURE: CPT | Performed by: INTERNAL MEDICINE

## 2025-07-17 PROCEDURE — 25010000002 NALOXONE PER 1 MG: Performed by: NURSE ANESTHETIST, CERTIFIED REGISTERED

## 2025-07-17 PROCEDURE — 87206 SMEAR FLUORESCENT/ACID STAI: CPT | Performed by: INTERNAL MEDICINE

## 2025-07-17 PROCEDURE — 82948 REAGENT STRIP/BLOOD GLUCOSE: CPT | Performed by: ANESTHESIOLOGY

## 2025-07-17 PROCEDURE — 25010000002 FENTANYL CITRATE (PF) 100 MCG/2ML SOLUTION

## 2025-07-17 PROCEDURE — 87205 SMEAR GRAM STAIN: CPT | Performed by: INTERNAL MEDICINE

## 2025-07-17 PROCEDURE — 82948 REAGENT STRIP/BLOOD GLUCOSE: CPT

## 2025-07-17 PROCEDURE — 88172 CYTP DX EVAL FNA 1ST EA SITE: CPT | Performed by: INTERNAL MEDICINE

## 2025-07-17 PROCEDURE — 88341 IMHCHEM/IMCYTCHM EA ADD ANTB: CPT | Performed by: INTERNAL MEDICINE

## 2025-07-17 PROCEDURE — 25010000002 PROPOFOL 10 MG/ML EMULSION

## 2025-07-17 PROCEDURE — 31628 BRONCHOSCOPY/LUNG BX EACH: CPT | Performed by: INTERNAL MEDICINE

## 2025-07-17 RX ORDER — FENTANYL CITRATE 50 UG/ML
50 INJECTION, SOLUTION INTRAMUSCULAR; INTRAVENOUS
Status: DISCONTINUED | OUTPATIENT
Start: 2025-07-17 | End: 2025-07-17 | Stop reason: HOSPADM

## 2025-07-17 RX ORDER — PROPOFOL 10 MG/ML
VIAL (ML) INTRAVENOUS AS NEEDED
Status: DISCONTINUED | OUTPATIENT
Start: 2025-07-17 | End: 2025-07-17 | Stop reason: SURG

## 2025-07-17 RX ORDER — DEXTROSE MONOHYDRATE 25 G/50ML
12.5 INJECTION, SOLUTION INTRAVENOUS AS NEEDED
Status: DISCONTINUED | OUTPATIENT
Start: 2025-07-17 | End: 2025-07-17 | Stop reason: HOSPADM

## 2025-07-17 RX ORDER — SODIUM CHLORIDE, SODIUM LACTATE, POTASSIUM CHLORIDE, CALCIUM CHLORIDE 600; 310; 30; 20 MG/100ML; MG/100ML; MG/100ML; MG/100ML
1000 INJECTION, SOLUTION INTRAVENOUS CONTINUOUS
Status: DISCONTINUED | OUTPATIENT
Start: 2025-07-17 | End: 2025-07-17 | Stop reason: HOSPADM

## 2025-07-17 RX ORDER — ONDANSETRON 2 MG/ML
4 INJECTION INTRAMUSCULAR; INTRAVENOUS ONCE AS NEEDED
Status: DISCONTINUED | OUTPATIENT
Start: 2025-07-17 | End: 2025-07-17 | Stop reason: HOSPADM

## 2025-07-17 RX ORDER — FLUMAZENIL 0.1 MG/ML
0.2 INJECTION INTRAVENOUS AS NEEDED
Status: DISCONTINUED | OUTPATIENT
Start: 2025-07-17 | End: 2025-07-17 | Stop reason: HOSPADM

## 2025-07-17 RX ORDER — FENTANYL CITRATE 50 UG/ML
INJECTION, SOLUTION INTRAMUSCULAR; INTRAVENOUS AS NEEDED
Status: DISCONTINUED | OUTPATIENT
Start: 2025-07-17 | End: 2025-07-17 | Stop reason: SURG

## 2025-07-17 RX ORDER — NALOXONE HCL 0.4 MG/ML
0.4 VIAL (ML) INJECTION AS NEEDED
Status: DISCONTINUED | OUTPATIENT
Start: 2025-07-17 | End: 2025-07-17 | Stop reason: HOSPADM

## 2025-07-17 RX ORDER — SODIUM CHLORIDE 9 MG/ML
40 INJECTION, SOLUTION INTRAVENOUS AS NEEDED
Status: DISCONTINUED | OUTPATIENT
Start: 2025-07-17 | End: 2025-07-17 | Stop reason: HOSPADM

## 2025-07-17 RX ORDER — LIDOCAINE HYDROCHLORIDE 10 MG/ML
0.5 INJECTION, SOLUTION EPIDURAL; INFILTRATION; INTRACAUDAL; PERINEURAL ONCE AS NEEDED
Status: DISCONTINUED | OUTPATIENT
Start: 2025-07-17 | End: 2025-07-17 | Stop reason: HOSPADM

## 2025-07-17 RX ORDER — SODIUM CHLORIDE 0.9 % (FLUSH) 0.9 %
10 SYRINGE (ML) INJECTION AS NEEDED
Status: DISCONTINUED | OUTPATIENT
Start: 2025-07-17 | End: 2025-07-17 | Stop reason: HOSPADM

## 2025-07-17 RX ORDER — SODIUM CHLORIDE 0.9 % (FLUSH) 0.9 %
10 SYRINGE (ML) INJECTION EVERY 12 HOURS SCHEDULED
Status: DISCONTINUED | OUTPATIENT
Start: 2025-07-17 | End: 2025-07-17 | Stop reason: HOSPADM

## 2025-07-17 RX ORDER — ROCURONIUM BROMIDE 10 MG/ML
INJECTION, SOLUTION INTRAVENOUS AS NEEDED
Status: DISCONTINUED | OUTPATIENT
Start: 2025-07-17 | End: 2025-07-17 | Stop reason: SURG

## 2025-07-17 RX ORDER — FENTANYL CITRATE 50 UG/ML
25 INJECTION, SOLUTION INTRAMUSCULAR; INTRAVENOUS
Status: DISCONTINUED | OUTPATIENT
Start: 2025-07-17 | End: 2025-07-17 | Stop reason: HOSPADM

## 2025-07-17 RX ORDER — LABETALOL HYDROCHLORIDE 5 MG/ML
5 INJECTION, SOLUTION INTRAVENOUS
Status: DISCONTINUED | OUTPATIENT
Start: 2025-07-17 | End: 2025-07-17 | Stop reason: HOSPADM

## 2025-07-17 RX ORDER — SODIUM CHLORIDE 0.9 % (FLUSH) 0.9 %
3 SYRINGE (ML) INJECTION AS NEEDED
Status: DISCONTINUED | OUTPATIENT
Start: 2025-07-17 | End: 2025-07-17 | Stop reason: HOSPADM

## 2025-07-17 RX ORDER — DEXAMETHASONE SODIUM PHOSPHATE 4 MG/ML
INJECTION, SOLUTION INTRA-ARTICULAR; INTRALESIONAL; INTRAMUSCULAR; INTRAVENOUS; SOFT TISSUE AS NEEDED
Status: DISCONTINUED | OUTPATIENT
Start: 2025-07-17 | End: 2025-07-17 | Stop reason: SURG

## 2025-07-17 RX ORDER — IBUPROFEN 600 MG/1
600 TABLET, FILM COATED ORAL EVERY 6 HOURS PRN
Status: DISCONTINUED | OUTPATIENT
Start: 2025-07-17 | End: 2025-07-17

## 2025-07-17 RX ORDER — HYDROCODONE BITARTRATE AND ACETAMINOPHEN 5; 325 MG/1; MG/1
1 TABLET ORAL EVERY 4 HOURS PRN
Status: DISCONTINUED | OUTPATIENT
Start: 2025-07-17 | End: 2025-07-17 | Stop reason: HOSPADM

## 2025-07-17 RX ORDER — ONDANSETRON 2 MG/ML
INJECTION INTRAMUSCULAR; INTRAVENOUS AS NEEDED
Status: DISCONTINUED | OUTPATIENT
Start: 2025-07-17 | End: 2025-07-17 | Stop reason: SURG

## 2025-07-17 RX ORDER — LIDOCAINE HYDROCHLORIDE 20 MG/ML
INJECTION, SOLUTION EPIDURAL; INFILTRATION; INTRACAUDAL; PERINEURAL AS NEEDED
Status: DISCONTINUED | OUTPATIENT
Start: 2025-07-17 | End: 2025-07-17 | Stop reason: SURG

## 2025-07-17 RX ORDER — HYDROCODONE BITARTRATE AND ACETAMINOPHEN 10; 325 MG/1; MG/1
1 TABLET ORAL EVERY 4 HOURS PRN
Status: DISCONTINUED | OUTPATIENT
Start: 2025-07-17 | End: 2025-07-17 | Stop reason: HOSPADM

## 2025-07-17 RX ORDER — NALOXONE HYDROCHLORIDE 0.4 MG/ML
INJECTION, SOLUTION INTRAMUSCULAR; INTRAVENOUS; SUBCUTANEOUS AS NEEDED
Status: DISCONTINUED | OUTPATIENT
Start: 2025-07-17 | End: 2025-07-17 | Stop reason: SURG

## 2025-07-17 RX ADMIN — SUGAMMADEX 200 MG: 100 INJECTION, SOLUTION INTRAVENOUS at 14:06

## 2025-07-17 RX ADMIN — ONDANSETRON 4 MG: 2 INJECTION INTRAMUSCULAR; INTRAVENOUS at 13:21

## 2025-07-17 RX ADMIN — FENTANYL CITRATE 25 MCG: 50 INJECTION, SOLUTION INTRAMUSCULAR; INTRAVENOUS at 12:52

## 2025-07-17 RX ADMIN — SODIUM CHLORIDE, POTASSIUM CHLORIDE, SODIUM LACTATE AND CALCIUM CHLORIDE: 600; 310; 30; 20 INJECTION, SOLUTION INTRAVENOUS at 13:11

## 2025-07-17 RX ADMIN — ROCURONIUM BROMIDE 50 MG: 10 INJECTION, SOLUTION INTRAVENOUS at 13:13

## 2025-07-17 RX ADMIN — NALOXONE HYDROCHLORIDE 0.08 MG: 0.4 INJECTION, SOLUTION INTRAMUSCULAR; INTRAVENOUS; SUBCUTANEOUS at 14:20

## 2025-07-17 RX ADMIN — DEXAMETHASONE SODIUM PHOSPHATE 4 MG: 4 INJECTION, SOLUTION INTRA-ARTICULAR; INTRALESIONAL; INTRAMUSCULAR; INTRAVENOUS; SOFT TISSUE at 13:21

## 2025-07-17 RX ADMIN — PROPOFOL 150 MG: 10 INJECTION, EMULSION INTRAVENOUS at 13:13

## 2025-07-17 RX ADMIN — FENTANYL CITRATE 100 MCG: 50 INJECTION, SOLUTION INTRAMUSCULAR; INTRAVENOUS at 13:13

## 2025-07-17 RX ADMIN — PROPOFOL 50 MG: 10 INJECTION, EMULSION INTRAVENOUS at 13:15

## 2025-07-17 RX ADMIN — LIDOCAINE HYDROCHLORIDE 100 MG: 20 INJECTION, SOLUTION EPIDURAL; INFILTRATION; INTRACAUDAL; PERINEURAL at 13:13

## 2025-07-17 RX ADMIN — LIDOCAINE HYDROCHLORIDE 100 MG: 20 INJECTION, SOLUTION EPIDURAL; INFILTRATION; INTRACAUDAL; PERINEURAL at 14:13

## 2025-07-17 NOTE — ANESTHESIA POSTPROCEDURE EVALUATION
"Patient: Francesca Toribio    Procedure Summary       Date: 07/17/25 Room / Location: [facility] OR  /  [facility] OR    Anesthesia Start: 1311 Anesthesia Stop: 1424    Procedure: BRONCHOSCOPY WITH ION ROBOT and BRONCHOSCOPY WITH ENDOBRONCHIAL ULTRASOUND (Bronchus) Diagnosis:       Lung nodule      (Lung nodule [R91.1])    Surgeons: Natacha Wright DO Provider: Philip Pelletier CRNA    Anesthesia Type: general ASA Status: 2            Anesthesia Type: general    Vitals  Vitals Value Taken Time   /59 07/17/25 14:59   Temp 97.9 °F (36.6 °C) 07/17/25 14:45   Pulse 77 07/17/25 14:59   Resp 16 07/17/25 14:59   SpO2 94 % 07/17/25 14:59           Post Anesthesia Care and Evaluation    Patient location during evaluation: PACU  Patient participation: complete - patient participated  Level of consciousness: awake and alert  Pain management: adequate    Airway patency: patent  Anesthetic complications: No anesthetic complications    Cardiovascular status: acceptable  Respiratory status: acceptable  Hydration status: acceptable    Comments: Blood pressure 122/59, pulse 77, temperature 97.9 °F (36.6 °C), temperature source Temporal, resp. rate 16, height 158 cm (62.21\"), weight 72.1 kg (158 lb 15.2 oz), SpO2 94%, not currently breastfeeding.    Pt discharged from PACU based on jannie score >8    "

## 2025-07-17 NOTE — ANESTHESIA PROCEDURE NOTES
Airway  Date/Time: 7/17/2025 1:15 PM    General Information and Staff    CRNA/CAA: Wil Ricci CRNA    Indications and Patient Condition  Indications for airway management: airway protection    Preoxygenated: yes  MILS maintained throughout    Mask difficulty assessment: 1 - vent by mask    Final Airway Details    Final airway type: endotracheal airway      Successful airway: ETT  Cuffed: yes   Successful intubation technique: video laryngoscopy and exchange catheter  Adjuncts used in placement: intubating stylet  Endotracheal tube insertion site: oral  Blade: Lee  Blade size: 3  ETT size (mm): 8.5  Cormack-Lehane Classification: grade I - full view of glottis  Placement verified by: capnometry   Cuff volume (mL): 7  Measured from: lips  ETT/EBT  to lips (cm): 22  Number of attempts at approach: 1  Assessment: lips, teeth, and gum same as pre-op and atraumatic intubation

## 2025-07-17 NOTE — ANESTHESIA PREPROCEDURE EVALUATION
Anesthesia Evaluation     Patient summary reviewed   no history of anesthetic complications:   NPO Solid Status: > 8 hours             Airway   Mallampati: II  Dental      Pulmonary    (+) COPD,sleep apnea  (-) asthma, not a smoker  Cardiovascular   Exercise tolerance: good (4-7 METS)    (+) hypertension, hyperlipidemia  (-) pacemaker, past MI, angina, cardiac stents      Neuro/Psych  (-) seizures, TIA, CVA  GI/Hepatic/Renal/Endo    (+) renal disease- CRI, diabetes mellitus, thyroid problem hypothyroidism  (-) GERD, liver disease    Musculoskeletal     Abdominal    Substance History      OB/GYN          Other                    Anesthesia Plan    ASA 2     general     intravenous induction     Anesthetic plan, risks, benefits, and alternatives have been provided, discussed and informed consent has been obtained with: patient.    CODE STATUS:

## 2025-07-17 NOTE — OP NOTE
Ion Bronchoscopy and Endobronchial Ultrasound  Procedure Note    Patient Name: Francesca Toribio  Patient : 1960  Date of Operation: 25  Pre-op Diagnosis: Lung nodule  Post-op Diagnosis: Same  Anesthesia: General anesthesia per anesthesia record    Indications for Procedure:   The patient is a 65 y.o.  female with Lung nodule.  The risks, benefits, complications, treatment options and expected outcomes were discussed with the patient.  The possibilities of reaction to medication, pulmonary aspiration, perforation of a viscus, bleeding, failure to diagnose a condition and creating a complication requiring transfusion or operation were discussed with the patient who freely signed the consent.  Time out was taken prior to the procedure.    Procedure Performed:  Ion robotic shape sensing navigational bronchoscopy with transbronchial needle aspirate of right upper lobe nodule with navigational, radial probe endobronchial ultrasound, and fluoroscopic guidance  Flexible video bronchoscopy with endobronchial ultrasound (EBUS) guided transbronchial needle aspiration of lymph nodes in stations 11R  Flexible diagnostic fiberoptic bronchoscopy with bronchoalveolar lavage of the right upper lobe    Bronchoscopist(s):  Natacha Wright DO    Equipment Used:  Intuitive Ion Robotic Bronchoscope  Olympus [id number] EBUS Bronchoscope  Olympus [id number] Bronchoscope    Ventilation:   Controlled mechanical ventilation utilizing endotracheal tube    Specimens Collected:   Transbronchial needle aspirate and forceps biopsy of right upper lobe nodule sent for cytopathology and histopathology  Transbronchial needle aspirate of lymph nodes in stations 11R sent for cytopathology   Bronchoalveolar lavage of right upper lobe sent for bacterial stain/culture, fungal stain/culture, AFB stain/culture, cell count, cytopathology    Complications:   None    Estimated Blood Loss:  Less than 10cc    Procedure in Detail:    Survey Portion  The  procedure risks, benefits, and alternatives were discussed with the patient and/or family member(s). The patient understood and informed consent was obtained. Laboratory studies and radiographs were reviewed. A time-out was performed. Following induction of general anesthesia, the patient was ventilated through endotracheal tube. The video bronchoscope was introduced through the ET tube. The visualized?trachea appeared normal and the main shon was sharp. The right and left bronchial trees were visualized to the segmental level. Inspection showed thin mucus which was suctioned.?  The conventional bronchoscope was removed after the collection of : bronchoalveolar lavage.      Ion Navigational Portion  Using the planning laptop and Planpoint software, the lesion of interest was identified, and marked, a pathway was generated, and anatomic borders were identified.The Ion system was magnetically docked to the ET tube. The shape sensing catheter with vision probe was introduced via the  into the ET tube.    Registration was started by advancing the bronchoscope into the right and left mainstem bronchi. The main shon was confirmed by rotating the live-view image to match the navigation-view image of the main shon. Registration was completed by advancing the catheter into the identified the lobar airway. There was minimal visual divergence. Navigation was complicated by : endobronchial mucous impairing visualization. Using the , the shape sensing bronchoscope was advanced to the target lesion. The mobile [employer] cone beam C-Arm was brought in, and the vision probe was removed. A peripheral radial ultrasound probe was utilized to better localize the lesion in the right upper lobe anterior segment, no radial EBUS signal was obtained.  A cone beam CT spin was performed and a target lesion updated.  The catheter required minimal adjustment.  When the radial EBUS probe was extended further a concentric  view was obtained.    Transbronchial needle aspirations of the target lesion were performed using an Intuitive Flexision 21 gauge needle and sent for routine cytopathology. The procedure was guided by fluoroscopy and radial ultrasound. Transbronchial needle aspiration technique was selected because the sampling site was not accessible using standard bronchoscopic techniques. 10 needle aspirations were obtained. Rapid on-site evaluation showed adequate sample.    Transbronchial biopsies of the target lesion were performed using a Cook DBF-1.8-S Captura spikeless forceps and sent for histopathology. The procedure was guided by fluoroscopy and radial ultrasound. Transbronchial biopsy technique was selected because the sampling site was not visible endoscopically. 10 biopsy samples were obtained. Bronchioalveolar lavage was then performed. 10 ml of iced saline was instilled through the shape sensing catheter. The catheter was connected to the suction syringe and the catheter was retracted slowly, producing an aspiration of 4 ml fluid which was blood-tinged. The vision probe was reintruced to examine the biopsied area, and then the vision probe, and shape sensing catheter was extracted.      EBUS Portion  After the Ion shape sensing catheter was extracted, the EBUS scope was inserted into the airway without difficulty.    Endobronchial ultrasound was used to examine, measure, and guide the sampling of lymph nodes as follows:  1. Station 11L. The node was isoechoic and the capsule was intact.This lymph node was not sampled due to not meeting size criteria.   2. Station 4L. The node was isoechoic and the capsule was intact.This lymph node was not sampled due to not meeting size criteria.   3. Station 7. The node was isoechoic and the capsule was intact.This lymph node was not sampled due to not meeting size criteria.   4. Station 4R. The node was isoechoic and the capsule was intact.This lymph node was not sampled due to  not meeting size criteria.   5. Station 10. The node was isoechoic and the capsule was intact.This lymph node was not sampled due to not meeting size criteria.   6. Station 11R. The largest lymph node met sampling size criteria. The node was isoechoic and the capsule was intact. Under EBUS guidance, 3 transbronchial needle aspirates were performed at this station. This lymph node was PET negative. Rapid onsite cytology demonstrated the presence of lymphocytes.       At this point, the EBUS scope was withdrawn from the airway and the conventional flexible video bronchoscope was reinserted.?A second survey of the accessible airway was performed to ensure hemostasis, which was adequate.  The conventional flexible video bronchoscope was removed and the ET tube undocked from the Ion system.  The patient was recovered under the direction of the anesthesiologist and transported to the recovery area. The results of the procedure will be discussed with the patient and appropriate follow up will be arranged.       Natacha Wright DO  Pulmonary/Critical Care Medicine  14:32 CDT  7/17/2025

## 2025-07-18 ENCOUNTER — RESULTS FOLLOW-UP (OUTPATIENT)
Dept: PERIOP | Facility: HOSPITAL | Age: 65
End: 2025-07-18
Payer: MEDICARE

## 2025-07-18 DIAGNOSIS — R93.41 ABNORMAL RADIOLOGIC FINDINGS ON DIAGNOSTIC IMAGING OF RENAL PELVIS, URETER, OR BLADDER: ICD-10-CM

## 2025-07-18 DIAGNOSIS — C34.11 ADENOCARCINOMA OF UPPER LOBE OF RIGHT LUNG: Primary | ICD-10-CM

## 2025-07-18 LAB
NIGHT BLUE STAIN TISS: NORMAL
NIGHT BLUE STAIN TISS: NORMAL

## 2025-07-19 LAB
BACTERIA SPEC AEROBE CULT: NORMAL
GRAM STN SPEC: NORMAL

## 2025-07-21 LAB
BEAKER LAB AP INTRAOPERATIVE CONSULTATION: NORMAL
LAB AP CASE REPORT: NORMAL
LAB AP CLINICAL INFORMATION: NORMAL
LAB AP DIAGNOSIS COMMENT: NORMAL
Lab: NORMAL
PATH REPORT.FINAL DX SPEC: NORMAL
PATH REPORT.GROSS SPEC: NORMAL

## 2025-07-23 NOTE — PROGRESS NOTES
[facility] HEMATOLOGY & ONCOLOGY  [address]  [facility] [address] [phone]    Patient Name: Francesca Toribio  Encounter Date: 08/01/2025  YOB: 1960  Patient Number: [id number]    Initial Note    REASON FOR CONSULTATION: Francesca Toribio is a 65 y.o. -American, female, previous smoker, 78 pack years,referred by Natacha Garcia* for management recommendations for adenocarcinoma of the right upper lobe of the the lung.  She is seen with daughter, Rishi by phone. History is obtained from patient. History is considered to be accurate.        HISTORY OF PRESENT ILLNESS: CT chest on 6/10/2024. No acute intrathoracic abnormality.  4 mm right upper lobe nodule (series 4, image 90). Follow-up per established guidelines.  Multiple thyroid nodules, some which have decreased in size.       CT chest 6/20/2025. Enlarging 9 mm solid right upper lobe nodule most consistent with bronchogenic carcinoma.  Numerous small groundglass opacities and small nodular opacities with interval enlargement of a semisolid opacity within the left lung apex also likely a very indolent adenocarcinoma.     Patient seen by Dr. Negrete on 7/3/2025 for lung nodule.  Recommend bronchoscopy.    Pulmonary function test 7/3/2025.The total lung capacity is decreased supporting mild restrictive dysfunction and the residual volume is increased suggesting air trapping consistent with obstructive dysfunction. The diffusion capacity corrected for alveolar volume is above normal limits.    CBC 7/10/2025 showed no cytopenias.  BMP remarkable for GFR 46 ml/min.      PET 7/11/2025.  Abnormal PET/CT, there is a 9 mm noncalcified pulmonary nodule in the right upper lobe, with a maximum SUV of 4.7, concerning for potential pulmonary malignancy. Consider follow-up with bronchoscopy and biopsy. No evidence of intrathoracic lymphadenopathy.  Small focus of  activity along the inferior margin of the left thyroid  lobe with a maximum SUV of 6.8, no CT correlate. Consider follow-up with ultrasound of the thyroid gland with careful evaluation for nodules at the inferior pole of the left thyroid lobe or the possibility of a parathyroid nodule. Anterior to the bladder there is a focus of FDG activity which corresponds with an area of soft tissue, possibly a urachal remnant as described above. Activity associated with a small neoplasm of a urachal remnant is possible. Follow-up with urology is recommended, with potential cystoscopy.      CT chest 7/11/2025.Solid pulmonary nodule in the right upper lobe with slightly irregular margins, measuring 10 mm, compared with approximately 9.3 mm on the outside chest CT. This nodule demonstrates abnormal FDG avidity on today's PET/CT, SUV max = 4.7. This is concerning for pulmonary malignancy and follow-up with bronchoscopy and biopsy has been recommended. Other tiny groundglass nodules in the lung apices primarily are unchanged. No evidence of intrathoracic lymphadenopathy is identified. The thyroid  gland is somewhat enlarged, thyroid ultrasound has been recommended based on the finding on today's PET/CT. This is described in a separate report.  Moderate volume of calcified plaque within the LAD coronary artery distribution.    Patient underwent bronchoscopy with endobronchial ultrasound 7/17/2025 by Dr. Wright.      Pathology report 7/21/2025.   Lung, right upper lobe (Ion robotic-assisted bronchoscopic FNA):  Non-small cell carcinoma, favor adenocarcinoma.  2.    Lung, right upper lobe (Ion robotic-assisted bronchoscopic biopsy):  Poorly-differentiated lung adenocarcinoma. See comment.  Lung, right upper lobe (bronchoalveolar lavage):  Rare clusters of markedly atypical cells consistent with non-small cell carcinoma, favor adenocarcinoma.  Lymph node, station 11R (endobronchial ultrasound-guided FNA):  Polymorphous lymphocytes  "with a background of benign respiratory epithelium.  No malignancy identified.  The cytologic findings are similar in parts 1-3. The biopsy (part 2) shows a poorly differentiated non-small cell carcinoma.  The tumor cells are positive for pancytokeratin (AE1/AE3) and TTF-1 (8G7G3/1), and negative for p40. The morphology  along with these immunohistochemical findings are consistent with a poorly differentiated lung adenocarcinoma.    Seen by Dr. Dunn 7/31/2025. Candidate for lobectomy. Patient undecided about lobectomy or SBRT.     To see Dr. Mendez 8/1/2025.     No prior malignancy.     Sister had metastatic ovarian cancer.       LABS    Lab Results - Last 18 Months   Lab Units 07/10/25  1521   HEMOGLOBIN g/dL 13.9   HEMATOCRIT % 41.1   MCV fL 87.1   WBC 10*3/mm3 9.08   RDW % 13.1   MPV fL 11.0   PLATELETS 10*3/mm3 274       Lab Results - Last 18 Months   Lab Units 07/10/25  1521 03/18/25  2218 10/14/24  1517 10/02/24  0628 06/13/24  1957 06/10/24  2141   GLUCOSE mg/dL 166*  --   --   --   --   --    SODIUM mmol/L 140  --  138 137 140 137   POTASSIUM mmol/L 3.4* 2.8* 3.4* 3.8 3.1* 3.3*   TOTAL CO2 mmol/L  --   --  25 23 25 19*   CO2 mmol/L 24.0  --   --   --   --   --    CHLORIDE mmol/L 98  --  98 100 101 99   ANION GAP mmol/L 18.0*  --  15 14 14 19*   CREATININE mg/dL 1.28*  --  0.93 0.94 0.98 1.3   BUN mg/dL 19.2  --  19* 23* 19 23   BUN / CREAT RATIO  15.0  --  20.4 24.5 19.4 17.7   CALCIUM mg/dL 10.0  --  10.3* 10.4* 10.2 9.8   EGFR IF NONAFRICN AM mL/min/1.73m2  --   --  >60.0 59.5* 56.7* 40.9*   ALK PHOS U/L  --   --  111 107  --  101   TOTAL PROTEIN g/dL  --   --  8 8  --  7.9   ALT (SGPT) U/L  --   --  17 10*  --  19   AST (SGOT) U/L  --   --  21 17  --  31   BILIRUBIN mg/dL  --   --  0.3 0.3  --  1   ALBUMIN g/dL  --   --  4.2 4  --  3.5       No results for input(s): \"MSPIKE\", \"KAPPALAMB\", \"IGLFLC\", \"URICACID\", \"FREEKAPPAL\", \"CEA\", \"LDH\", \"REFLABREPO\" in the last 92009 hours.    Lab Results - Last 18 Months "   Lab Units 03/19/25  0456   TSH uIU/mL 0.31*         PAST MEDICAL HISTORY:  ALLERGIES:  Allergies   Allergen Reactions    Ketorolac Swelling and Rash     Torodol    Statins Myalgia     Muscle and joint pain    Morphine Itching and Rash     CURRENT MEDICATIONS:  Outpatient Encounter Medications as of 8/1/2025   Medication Sig Dispense Refill    Accu-Chek Softclix Lancets lancets       albuterol (PROVENTIL) (2.5 MG/3ML) 0.083% nebulizer solution Take 2.5 mg by nebulization 4 (Four) Times a Day.      albuterol sulfate  (90 Base) MCG/ACT inhaler Inhale 2 puffs Every 4 (Four) Hours As Needed for Wheezing. 6.7 g 5    amitriptyline (ELAVIL) 10 MG tablet Take 1 tablet by mouth Every Night.      amLODIPine (NORVASC) 2.5 MG tablet Take 1 tablet by mouth Daily.      aspirin 81 MG EC tablet Take 1 tablet by mouth Daily.      Cholecalciferol 25 MCG (1000 UT) tablet Take 1 tablet by mouth Daily.      Diclofenac Sodium (VOLTAREN) 1 % gel gel Apply 4 g topically to the appropriate area as directed 4 (Four) Times a Day.      Embecta Pen Needle Marjan 2 Gen 32G X 4 MM misc       ESCITALOPRAM OXALATE PO Take 20 mg by mouth Daily.      fluticasone (Flonase Allergy Relief) 50 MCG/ACT nasal spray Administer 2 sprays into the nostril(s) as directed by provider Daily. 16 g 5    Fluticasone-Salmeterol (ADVAIR/WIXELA) 250-50 MCG/ACT DISKUS Inhale 1 puff 2 (Two) Times a Day. 60 each 5    gemfibrozil (LOPID) 600 MG tablet Take 1 tablet by mouth 2 (Two) Times a Day.      glipizide (GLUCOTROL XL) 10 MG 24 hr tablet Take 1 tablet by mouth Daily.      glucose blood (Accu-Chek Soheila Plus) test strip 1 each by Other route Daily.      glucose blood (Accu-Chek Soheila Plus) test strip 1 each by Other route See Admin Instructions.      hydroCHLOROthiazide 25 MG tablet Take 1 tablet by mouth Daily.      ipratropium-albuterol (DUO-NEB) 0.5-2.5 mg/3 ml nebulizer Take 3 mL by nebulization 4 (Four) Times a Day.      lactulose (Constulose) 10 GM/15ML  solution Take 15 mL by mouth As Needed.      levothyroxine (SYNTHROID, LEVOTHROID) 50 MCG tablet Take 1 tablet by mouth Daily.      lisinopril-hydrochlorothiazide (PRINZIDE,ZESTORETIC) 20-25 MG per tablet Take 1 tablet by mouth Daily.      loratadine (CLARITIN) 10 MG tablet Take 1 tablet by mouth Daily. 90 tablet 3    meloxicam (MOBIC) 15 MG tablet Take 1 tablet by mouth Daily.      metFORMIN ER (GLUCOPHAGE-XR) 500 MG 24 hr tablet Take 1 tablet by mouth Daily.      metFORMIN ER (GLUCOPHAGE-XR) 500 MG 24 hr tablet Take 1 tablet by mouth 3 (Three) Times a Day.      methocarbamol (ROBAXIN) 750 MG tablet Take 1 tablet by mouth 3 (Three) Times a Day As Needed for Muscle Spasms.      mirtazapine (REMERON) 45 MG tablet Take 1 tablet by mouth Daily.      NovoLOG FlexPen 100 UNIT/ML solution pen-injector sc pen Inject 5-10 Units under the skin into the appropriate area as directed. Patient has not picked this up yet from pharmacy      Omega-3 Fatty Acids (fish oil) 1000 MG capsule capsule Take 1 capsule by mouth 3 (Three) Times a Day.      ondansetron ODT (ZOFRAN-ODT) 4 MG disintegrating tablet Place 2 tablets on the tongue Every 8 (Eight) Hours As Needed.      oxybutynin (DITROPAN) 5 MG tablet Take 1 tablet by mouth 2 (Two) Times a Day.      pantoprazole (PROTONIX) 40 MG EC tablet Take 1 tablet by mouth Daily.      [DISCONTINUED] albuterol sulfate  (90 Base) MCG/ACT inhaler Inhale 2 puffs Every 6 (Six) Hours As Needed.      [DISCONTINUED] Omega-3 Fatty Acids (Fish Oil Ultra) 1400 MG capsule Take 1 capsule by mouth Daily. (Patient not taking: Reported on 7/31/2025)      [DISCONTINUED] OMEGA-3-ACID ETHYL ESTERS PO Take 1 mg by mouth 3 (Three) Times a Day. (Patient not taking: Reported on 7/31/2025)      [DISCONTINUED] omeprazole (priLOSEC) 20 MG capsule Take 1 capsule by mouth Daily. (Patient not taking: Reported on 7/31/2025)      [DISCONTINUED] pioglitazone (ACTOS) 30 MG tablet Take 1 tablet by mouth Daily.       [DISCONTINUED] predniSONE (DELTASONE) 20 MG tablet Take 1 tablet by mouth Daily. (Patient not taking: Reported on 7/31/2025)      [DISCONTINUED] simvastatin (ZOCOR) 40 MG tablet Take 1 tablet by mouth Daily. (Patient not taking: Reported on 7/31/2025)      [DISCONTINUED] tolterodine (DETROL) 1 MG tablet Take 1 tablet by mouth. (Patient not taking: Reported on 7/31/2025)       No facility-administered encounter medications on file as of 8/1/2025.     ADULT ILLNESSES:  Patient Active Problem List   Diagnosis Code    Hypertension I10    Sleep apnea G47.30    Chest pain R07.9    Torticollis M43.6    Tinea corporis B35.4    Osteoarthritis M19.90    Lung nodule R91.1    Tobacco abuse Z72.0    History of COVID-19 Z86.16    COPD (chronic obstructive pulmonary disease) J44.9    Non-seasonal allergic rhinitis J30.89    Restrictive lung disease J98.4    Abnormal CT of the chest R93.89    Malignant neoplasm of upper lobe of right lung C34.11    Primary lung adenocarcinoma, right C34.91    Former smoker Z87.891     SURGERIES:  Past Surgical History:   Procedure Laterality Date    BRONCHOSCOPY WITH ION ROBOTIC ASSIST N/A 7/17/2025    Procedure: BRONCHOSCOPY WITH ION ROBOT and BRONCHOSCOPY WITH ENDOBRONCHIAL ULTRASOUND;  Surgeon: Natacha Wright DO;  Location: [facility] OR;  Service: Robotics - Pulmonary;  Laterality: N/A;  preop; lung nodule   postop lung nodule   PCP Jackie Franklin    CHOLECYSTECTOMY      HYSTERECTOMY  1988    total    TUBAL ABDOMINAL LIGATION  1980     HEALTH MAINTENANCE ITEMS:  Health Maintenance Due   Topic Date Due    DXA SCAN  Never done    DIABETIC FOOT EXAM  Never done    DIABETIC EYE EXAM  Never done    URINE MICROALBUMIN-CREATININE RATIO (uACR)  Never done    ZOSTER VACCINE (1 of 2) Never done    COVID-19 Vaccine (3 - 2024-25 season) 09/01/2024    HEPATITIS C SCREENING  Never done    ANNUAL WELLNESS VISIT  06/30/2025       <no information>  Last Completed Colonoscopy            Needs Review        "COLORECTAL CANCER SCREENING (COLONOSCOPY - Every 10 Years) Tentatively due on 2017  Outside Procedure: COLONOSCOPY                          Immunization History   Administered Date(s) Administered    -influenza Vac Quardvalent Preservativ 10/21/2014, 10/23/2017    COVID-19 (MODERNA) 1st,2nd,3rd Dose Monovalent 04/15/2021, 2021    FLUAD TRI 65YR+ 10/25/2024    Fluzone  >6mos 2016    Fluzone (or Fluarix & Flulaval for VFC) >6mos 10/31/2022    Fluzone High-Dose 65+yrs 10/30/2023    Hepatitis B Adult/Adolescent IM 2014, 2014    Influenza Seasonal Injectable 01/15/2017    MMR 2014, 2014    Pneumococcal Polysaccharide (PPSV23) 2016    Tdap 10/31/2016     Last Completed Mammogram            Upcoming       MAMMOGRAM (Every 2 Years) Next due on 2025  Mammo Diagnostic Bilateral With CAD    2024  Mammo Diagnostic Bilateral With CAD    2023  Mammo Diagnostic Bilateral With CAD    2020  Mammo Screening Digital Tomosynthesis Bilateral With CAD                              FAMILY HISTORY:  Family History   Problem Relation Age of Onset    Heart failure Sister         congestive    Heart failure Brother         congestive     SOCIAL HISTORY:  Social History     Socioeconomic History    Marital status:    Tobacco Use    Smoking status: Former     Current packs/day: 0.00     Average packs/day: 1.5 packs/day for 52.6 years (78.8 ttl pk-yrs)     Types: Cigarettes     Start date:      Quit date: 2025     Years since quittin.0     Passive exposure: Current   Vaping Use    Vaping status: Never Used   Substance and Sexual Activity    Drug use: Not Currently     Types: Marijuana     Comment: \"I did\"    Sexual activity: Defer       REVIEW OF SYSTEMS:  Review of Systems   Constitutional:  Negative for fatigue and fever.        \"I feel fine.\"   HENT:  Negative for congestion and facial swelling.    Eyes:  Negative for " "redness and visual disturbance.   Respiratory:  Negative for shortness of breath and wheezing.    Cardiovascular:  Negative for chest pain and leg swelling.   Gastrointestinal:  Negative for constipation, diarrhea, nausea, vomiting and indigestion.   Endocrine: Negative for cold intolerance and heat intolerance.   Genitourinary:  Negative for difficulty urinating and dysuria.   Musculoskeletal:  Negative for gait problem.   Skin:  Negative for pallor.   Allergic/Immunologic: Negative for food allergies.   Neurological:  Negative for speech difficulty, weakness and confusion.   Hematological:  Negative for adenopathy. Bruises/bleeds easily.   Psychiatric/Behavioral:  Negative for agitation and hallucinations. The patient is not nervous/anxious.        /68   Pulse 85   Temp 97 °F (36.1 °C)   Resp 16   Ht 158 cm (62.21\")   Wt 71.5 kg (157 lb 9.6 oz)   SpO2 98%   Breastfeeding No   BMI 28.63 kg/m²  Body surface area is 1.73 meters squared.  Pain Score    08/01/25 0836   PainSc: 0-No pain       Physical Exam:  Physical Exam  Vitals reviewed.   Constitutional:       General: She is not in acute distress.  HENT:      Head: Normocephalic and atraumatic.   Eyes:      General: No scleral icterus.  Cardiovascular:      Rate and Rhythm: Normal rate.   Pulmonary:      Effort: No respiratory distress.      Breath sounds: No wheezing.   Abdominal:      General: Bowel sounds are normal.      Palpations: Abdomen is soft.   Musculoskeletal:         General: No swelling.   Skin:     Coloration: Skin is not pale.   Neurological:      Mental Status: She is alert and oriented to person, place, and time.   Psychiatric:         Mood and Affect: Mood normal.         Behavior: Behavior normal.         Thought Content: Thought content normal.         Judgment: Judgment normal.         Francesca Malu reports a pain score of 0.          ASSESSMENT:  Adenocarcinoma the right upper lobe of the lung.  Tumor size 9 mm by PET.  AJCC " "stage:1A2 (cT1b, cN0, cM0, G3).  Treatment status: Pending lobectomy or SBRT.  2.  Performance status of 0.  3.  PET activity inferior margin of the left thyroid.Followed by Dr. Liu.   4.  Anterior to the bladder there is a focus of FDG activity which corresponds with an area of soft tissue, possibly a urachal remnant.  No unifying diagnosis. \"Will see Dr. Nunez August 28th.\"          PLAN:   regarding the reason for the referral.  Reviewed NCCN guidelines for stage IA2 disease.   2.   regarding staging with brain MRI.  3.   regarding option for lobectomy.  If patient decided against surgery.  Then to follow with radiation oncology for SBRT.  4.  Order brain MRI for staging  5.  Thyroid ultrasound 8/4/2025 due to PET activity inferior margin of the left thyroid.  6.  To see Dr. Nunez 8/28/2025 for activity anterior to the bladder.  7.  Anticipate surveillance post definitive therapy with imaging every 6 months for 3 years then annually thereafter.  8.  Blood for CBC differential and CMP next visit.  9.  Continue care per primary care physician and other specialists.  10.  Plan of care discussed with patient and daughters.  Understanding expressed.  Patient agreed to proceed.  11. Advance Care Planning   ACP discussion was declined by the patient. Patient does not have an advance directive, information provided.   12.  Return to office in 4 weeks.  13.  Further recommendations pending.  14.  Work excuse for daughter.       Thank you for the referral.            I have reviewed the assessment and plan and verified the accuracy of it. No changes to assessment and plan since the information was documented. Alfred Killian MD 08/01/25           I spent 67 total minutes, face-to-face, caring for Francesca melendez. Greater than 50% of this time involved counseling and/or coordination of care as documented within this note.     (Nick Liu MD)  Thomas Wright MD/Nicole Negrete MD  [facility], " MD Kendrick Mendez, MD Jackie Franklin, APRN

## 2025-07-24 LAB
FUNGUS WND CULT: NORMAL
MYCOBACTERIUM SPEC CULT: NORMAL
NIGHT BLUE STAIN TISS: NORMAL
NIGHT BLUE STAIN TISS: NORMAL

## 2025-07-31 ENCOUNTER — OFFICE VISIT (OUTPATIENT)
Dept: CARDIAC SURGERY | Facility: CLINIC | Age: 65
End: 2025-07-31
Payer: MEDICARE

## 2025-07-31 ENCOUNTER — OFFICE VISIT (OUTPATIENT)
Dept: PULMONOLOGY | Facility: CLINIC | Age: 65
End: 2025-07-31
Payer: MEDICARE

## 2025-07-31 VITALS
OXYGEN SATURATION: 98 % | WEIGHT: 158 LBS | HEART RATE: 96 BPM | SYSTOLIC BLOOD PRESSURE: 124 MMHG | HEIGHT: 62 IN | BODY MASS INDEX: 29.08 KG/M2 | DIASTOLIC BLOOD PRESSURE: 66 MMHG

## 2025-07-31 VITALS
BODY MASS INDEX: 29.11 KG/M2 | WEIGHT: 158.2 LBS | HEART RATE: 89 BPM | DIASTOLIC BLOOD PRESSURE: 66 MMHG | OXYGEN SATURATION: 99 % | HEIGHT: 62 IN | SYSTOLIC BLOOD PRESSURE: 124 MMHG

## 2025-07-31 DIAGNOSIS — C80.1 ADENOCARCINOMA: ICD-10-CM

## 2025-07-31 DIAGNOSIS — R91.1 LUNG NODULE: ICD-10-CM

## 2025-07-31 DIAGNOSIS — R94.8 ABNORMAL POSITRON EMISSION TOMOGRAPHY (PET) SCAN: ICD-10-CM

## 2025-07-31 DIAGNOSIS — J44.9 CHRONIC OBSTRUCTIVE PULMONARY DISEASE, UNSPECIFIED COPD TYPE: ICD-10-CM

## 2025-07-31 DIAGNOSIS — Z86.16 HISTORY OF COVID-19: ICD-10-CM

## 2025-07-31 DIAGNOSIS — J30.89 NON-SEASONAL ALLERGIC RHINITIS, UNSPECIFIED TRIGGER: ICD-10-CM

## 2025-07-31 DIAGNOSIS — G47.30 SLEEP APNEA, UNSPECIFIED TYPE: ICD-10-CM

## 2025-07-31 DIAGNOSIS — C34.11 MALIGNANT NEOPLASM OF UPPER LOBE OF RIGHT LUNG: Primary | ICD-10-CM

## 2025-07-31 DIAGNOSIS — C34.91 PRIMARY LUNG ADENOCARCINOMA, RIGHT: ICD-10-CM

## 2025-07-31 DIAGNOSIS — J98.4 RESTRICTIVE LUNG DISEASE: ICD-10-CM

## 2025-07-31 DIAGNOSIS — E04.1 THYROID NODULE: Primary | ICD-10-CM

## 2025-07-31 PROBLEM — Z87.891 FORMER SMOKER: Status: ACTIVE | Noted: 2025-07-31

## 2025-07-31 PROCEDURE — 1160F RVW MEDS BY RX/DR IN RCRD: CPT | Performed by: SURGERY

## 2025-07-31 PROCEDURE — 99204 OFFICE O/P NEW MOD 45 MIN: CPT | Performed by: SURGERY

## 2025-07-31 PROCEDURE — 3074F SYST BP LT 130 MM HG: CPT | Performed by: SURGERY

## 2025-07-31 PROCEDURE — 99214 OFFICE O/P EST MOD 30 MIN: CPT | Performed by: INTERNAL MEDICINE

## 2025-07-31 PROCEDURE — 3074F SYST BP LT 130 MM HG: CPT | Performed by: INTERNAL MEDICINE

## 2025-07-31 PROCEDURE — 3078F DIAST BP <80 MM HG: CPT | Performed by: SURGERY

## 2025-07-31 PROCEDURE — 1159F MED LIST DOCD IN RCRD: CPT | Performed by: SURGERY

## 2025-07-31 PROCEDURE — 3078F DIAST BP <80 MM HG: CPT | Performed by: INTERNAL MEDICINE

## 2025-07-31 RX ORDER — INSULIN ASPART 100 [IU]/ML
5-10 INJECTION, SOLUTION INTRAVENOUS; SUBCUTANEOUS
COMMUNITY
Start: 2025-07-31

## 2025-07-31 RX ORDER — PEN NEEDLE, DIABETIC 32GX 5/32"
NEEDLE, DISPOSABLE MISCELLANEOUS
COMMUNITY
Start: 2025-07-31

## 2025-07-31 NOTE — PROGRESS NOTES
[facility] OUTPATIENT PROGRESS NOTE    Patient: Francesca Toribio  : 1960  Age: 65 y.o.  Date of Service: 2025    REASON FOR CLINIC VISIT:  Chief Complaint   Patient presents with    Lung Nodule       Subjective:    History of Present Illness:  Francesca Toribio is a 65 y.o. female who presents to the office today to be seen for    Diagnosis Plan   1. Malignant neoplasm of upper lobe of right lung        2. Chronic obstructive pulmonary disease, unspecified COPD type        3. Sleep apnea, unspecified type        4. History of COVID-19        5. Non-seasonal allergic rhinitis, unspecified trigger        6. Primary lung adenocarcinoma, right        7. Restrictive lung disease        8. Lung nodule        .  Other problems per record.    History:    Patient is a very pleasant middle-aged -American female who was seen in the pulmonary clinic for a follow-up visit.  She was accompanied by her daughter during the clinic visit.    She was seen by me a few weeks ago for a right upper lobe lung nodule and a navigational and EBUS bronchoscopy was arranged with Dr. Melvin Wright.  The diagnosis came back positive for adenocarcinoma the lung poorly differentiated and patient is already referred to oncology and cardiothoracic surgery.  She already visited Dr. Rik Dunn from cardiothoracic surgery and he offered the patient a lobectomy but patient preferred to explore other options.  The patient was also advised to have a single beam radiation treatment or continue the chemotherapy or immunotherapy per Dr. Killian's recommendation.  They are not decided yet.  Patient is waiting to see Dr. Killian tomorrow    She is currently using Advair and also uses albuterol rescue inhaler.  She is a former smoker when did quit smoke.  She is also suspected to have sleep apnea and is waiting for a sleep study in 2025.  She is not requiring any oxygen at home.  She lives independently but her  daughter is involved in her care.  She did not need any refill of her medications.  For her nasal allergies she is using fluticasone nasal spray and loratadine.  The patient had a PET scan done after initial visit which showed increased SUV uptake in the right-sided lung mass or nodule and there was a few satellite nodules probably metastatic noted as well with increased SUV uptake.  Patient also had some thyroid nodules with the increased SUV uptake noted for which she may need further workup after this initial workup is done.     PFT done today:  Not done today    PFT Values          7/3/2025    10:00   Pre Drug PFT Results   FVC 77   FEV1 83   FEF 25-75% 104   FEV1/FVC 84   Other Tests PFT Results   TLC 85      DLCO 79   D/VAsb 114     Results for orders placed in visit on 25    Spirometry with Diffusion Capacity & Lung Volumes    Narrative  Spirometry with Diffusion Capacity & Lung Volumes    Performed by: Zainab García, RRT  Authorized by: Nicole Negrete MD  Pre Drug % Predicted  FVC: 77%  FEV1: 83%  FEF 25-75%: 104%  FEV1/FVC: 84%  T%  RV: 118%  DLCO: 79%  D/VAsb: 114%    Interpretation  Overall comments:  The above test results are acceptable and replacement by ATS criteria  From analysis of the above procedures patient showed evidence of moderate obstructive airway dysfunction.  This is confirmed by decreasing FEV1 although the FEF 25 to 75% is normal and the FEV1/FVC is 84.  No bronchodilator challenge was done.  The total lung capacity is decreased supporting mild restrictive dysfunction and the residual volume is increased suggesting air trapping consistent with obstructive dysfunction.  The diffusion capacity corrected for alveolar volume is above normal limits    No prior test available for comparison.  Clinical correlation is indicated    Nicole Negrete MD  Pulmonologist/Intensivist  7/3/2025 12:36 CDT         Bronchodilator therapy: Advair and Albuterol rescue inhaler as  needed     Smoking Status:   Social History     Tobacco Use   Smoking Status Former    Current packs/day: 0.00    Average packs/day: 1.5 packs/day for 52.6 years (78.8 ttl pk-yrs)    Types: Cigarettes    Start date:     Quit date: 2025    Years since quittin.0    Passive exposure: Current   Smokeless Tobacco Not on file     Pulm Rehab: no  Sleep: yes    Support System: lives alone    Code Status:   There are no questions and answers to display.        Review of Systems:  A complete review of systems is performed and all other systems were reviewed and negative as note above in the HPI.  Review of Systems    CAT/ACT Score:  Not done today    Medications:  Outpatient Encounter Medications as of 2025   Medication Sig Dispense Refill    Accu-Chek Softclix Lancets lancets       albuterol (PROVENTIL) (2.5 MG/3ML) 0.083% nebulizer solution Take 2.5 mg by nebulization 4 (Four) Times a Day.      albuterol sulfate  (90 Base) MCG/ACT inhaler Inhale 2 puffs Every 4 (Four) Hours As Needed for Wheezing. 6.7 g 5    amitriptyline (ELAVIL) 10 MG tablet Take 1 tablet by mouth Every Night.      amLODIPine (NORVASC) 2.5 MG tablet Take 1 tablet by mouth Daily.      aspirin 81 MG EC tablet Take 1 tablet by mouth Daily.      Cholecalciferol 25 MCG (1000 UT) tablet Take 1 tablet by mouth Daily.      Diclofenac Sodium (VOLTAREN) 1 % gel gel Apply 4 g topically to the appropriate area as directed 4 (Four) Times a Day.      Embecta Pen Needle Marjan 2 Gen 32G X 4 MM misc       ESCITALOPRAM OXALATE PO Take 20 mg by mouth Daily.      fluticasone (Flonase Allergy Relief) 50 MCG/ACT nasal spray Administer 2 sprays into the nostril(s) as directed by provider Daily. 16 g 5    Fluticasone-Salmeterol (ADVAIR/WIXELA) 250-50 MCG/ACT DISKUS Inhale 1 puff 2 (Two) Times a Day. 60 each 5    gemfibrozil (LOPID) 600 MG tablet Take 1 tablet by mouth 2 (Two) Times a Day.      glipizide (GLUCOTROL XL) 10 MG 24 hr tablet Take 1 tablet by  mouth Daily.      glucose blood (Accu-Chek Soheila Plus) test strip 1 each by Other route Daily.      glucose blood (Accu-Chek Soheila Plus) test strip 1 each by Other route See Admin Instructions.      hydroCHLOROthiazide 25 MG tablet Take 1 tablet by mouth Daily.      ipratropium-albuterol (DUO-NEB) 0.5-2.5 mg/3 ml nebulizer Take 3 mL by nebulization 4 (Four) Times a Day.      lactulose (Constulose) 10 GM/15ML solution Take 15 mL by mouth As Needed.      levothyroxine (SYNTHROID, LEVOTHROID) 50 MCG tablet Take 1 tablet by mouth Daily.      lisinopril-hydrochlorothiazide (PRINZIDE,ZESTORETIC) 20-25 MG per tablet Take 1 tablet by mouth Daily.      loratadine (CLARITIN) 10 MG tablet Take 1 tablet by mouth Daily. 90 tablet 3    meloxicam (MOBIC) 15 MG tablet Take 1 tablet by mouth Daily.      metFORMIN ER (GLUCOPHAGE-XR) 500 MG 24 hr tablet Take 1 tablet by mouth Daily.      metFORMIN ER (GLUCOPHAGE-XR) 500 MG 24 hr tablet Take 1 tablet by mouth 3 (Three) Times a Day.      methocarbamol (ROBAXIN) 750 MG tablet Take 1 tablet by mouth 3 (Three) Times a Day As Needed for Muscle Spasms.      mirtazapine (REMERON) 45 MG tablet Take 1 tablet by mouth Daily.      NovoLOG FlexPen 100 UNIT/ML solution pen-injector sc pen Inject 5-10 Units under the skin into the appropriate area as directed. Patient has not picked this up yet from pharmacy      Omega-3 Fatty Acids (fish oil) 1000 MG capsule capsule Take 1 capsule by mouth 3 (Three) Times a Day.      ondansetron ODT (ZOFRAN-ODT) 4 MG disintegrating tablet Place 2 tablets on the tongue Every 8 (Eight) Hours As Needed.      oxybutynin (DITROPAN) 5 MG tablet Take 1 tablet by mouth 2 (Two) Times a Day.      pantoprazole (PROTONIX) 40 MG EC tablet Take 1 tablet by mouth Daily.      [DISCONTINUED] albuterol sulfate  (90 Base) MCG/ACT inhaler Inhale 2 puffs Every 6 (Six) Hours As Needed.      [DISCONTINUED] cefdinir (OMNICEF) 300 MG capsule Take 1 capsule by mouth 2 (Two) Times a  "Day.      [DISCONTINUED] levoFLOXacin (LEVAQUIN) 250 MG tablet       [DISCONTINUED] Omega-3 Fatty Acids (Fish Oil Ultra) 1400 MG capsule Take 1 capsule by mouth Daily. (Patient not taking: Reported on 7/31/2025)      [DISCONTINUED] OMEGA-3-ACID ETHYL ESTERS PO Take 1 mg by mouth 3 (Three) Times a Day. (Patient not taking: Reported on 7/31/2025)      [DISCONTINUED] omeprazole (priLOSEC) 20 MG capsule Take 1 capsule by mouth Daily. (Patient not taking: Reported on 7/31/2025)      [DISCONTINUED] pioglitazone (ACTOS) 30 MG tablet Take 1 tablet by mouth Daily.      [DISCONTINUED] predniSONE (DELTASONE) 20 MG tablet Take 1 tablet by mouth Daily. (Patient not taking: Reported on 7/31/2025)      [DISCONTINUED] simvastatin (ZOCOR) 40 MG tablet Take 1 tablet by mouth Daily. (Patient not taking: Reported on 7/31/2025)      [DISCONTINUED] tolterodine (DETROL) 1 MG tablet Take 1 tablet by mouth. (Patient not taking: Reported on 7/31/2025)       No facility-administered encounter medications on file as of 7/31/2025.       Allergies:  Allergies   Allergen Reactions    Ketorolac Swelling and Rash     Torodol    Statins Myalgia     Muscle and joint pain    Morphine Itching and Rash       Immunizations:  Immunization History   Administered Date(s) Administered    31-influenza Vac Quardvalent Preservativ 10/21/2014, 10/23/2017    COVID-19 (MODERNA) 1st,2nd,3rd Dose Monovalent 04/15/2021, 05/18/2021    FLUAD TRI 65YR+ 10/25/2024    Fluzone  >6mos 12/17/2016    Fluzone (or Fluarix & Flulaval for VFC) >6mos 10/31/2022    Fluzone High-Dose 65+yrs 10/30/2023    Hepatitis B Adult/Adolescent IM 02/21/2014, 04/23/2014    Influenza Seasonal Injectable 01/15/2017    MMR 02/21/2014, 04/23/2014    Pneumococcal Polysaccharide (PPSV23) 12/17/2016    Tdap 10/31/2016       Objective:    Vitals:  /66   Pulse 96   Ht 158 cm (62.21\")   Wt 71.7 kg (158 lb)   SpO2 98% Comment: RA  Breastfeeding No   BMI 28.70 kg/m²     Physical Exam:  General: " Patient is a 65 y.o. pleasant  female. Looks stated age. Appears to be in no acute distress.  Eyes: EOMI. PERRLA. Vision intact. No scleral icterus.  Ear, Nose, Mouth and Throat: Hearing is grossly intact. No Leukoplakia, pharyngitis, stomatitis or thrush. Swollen nasal mucosa with post nasal drop.  Neck: Range of motion of neck normal. No thyromegaly or masses. Mallampati Class 3  Respiratory: Clear to auscultation bilaterally. No use of accessory muscles. Decreased breath sounds.  Cardiovascular: Normal heart sounds. Regularly regular rhythm without murmur.  Gastrointestinal: Non tender, non distended, soft. Bowel sounds positive in all four quadrants. No organomegaly.  Skin: No obvious rashes, lesions, ulcers or large amount of bruising. No edema.   Neurological: No new motor deficits. Cranial nerves appear intact.  Psychiatric: Patient is alert and oriented to person, place and time.    Chest Imaging:      Study Result    Narrative & Impression   EXAMINATION: CT CHEST WO CONTRAST DIAGNOSTIC- 7/11/2025 3:49 PM     HISTORY: Lung nodule; R91.1-Solitary pulmonary nodule     TOTAL DOSE: 343.73 mGy.cm (Automatic exposure control technique was  implemented in an effort to keep the radiation dose as low as possible  without compromising image quality)     Images are stored in PACS per institutional protocols and regulatory  requirements.     REPORT: Spiral CT of the chest was performed without intravenous  contrast from the thoracic inlet through the upper abdomen.  Reconstructed coronal and sagittal images are also reviewed.     Comparison: CT of the chest without contrast [facility] 6/20/2025. PET/CT from this institution also performed  today.     Review of lung windows demonstrates a stable 2 mm subpleural nodule at  the lateral aspect of the right lung apex. There is mild subpleural  scarring at the apices which is unchanged. There are several tiny  subpleural  groundglass nodules in the right upper lobe as before, none  measuring more than 4 mm. In the left lung apex there is a groundglass  nodule image 24 series 4 that measures 5 mm. This appears stable.  Groundglass nodule or patchy subpleural scarring is noted in the  posterior left upper lobe image 35 series 4. This is unchanged, measures  approximately 10 mm.     In the right upper lobe image 65 series 4 there is a solid nodule with  slightly irregular margins, measuring 10 mm. This measured about 9.3 mm  on the previous outside CT. On today's PET CT, this demonstrates FDG  avidity with a maximum SUV of 4.7. Based on that finding, follow-up  bronchoscopy and biopsy has been recommended.     A few other subpleural micronodules are present in the lung bases. No  pneumothorax or pleural effusion is identified. The airways are patent.  Review of mediastinal windows demonstrates small calcified bilateral  hilar lymph nodes. Mild enlargement of the thyroid gland, thyroid  ultrasound has been recommended based on a findings seen on today's  PET/CT, described in a separate report.     Normal caliber of the thoracic aorta. Moderate calcified plaque is noted  within the LAD coronary artery distribution. The central pulmonary  arteries are normal in caliber. Heart size is normal. In the upper  abdomen, no acute abnormality is identified. Cholecystectomy clips are  present. Moderate calcified plaque is present within the upper abdominal  aorta. Review of bone windows shows no acute osseous abnormality.  Degenerative changes are seen throughout the cervical spine, several  levels in the mid and lower thoracic spine.     IMPRESSION:  1. Solid pulmonary nodule in the right upper lobe with slightly  irregular margins, measuring 10 mm, compared with approximately 9.3 mm  on the outside chest CT. This nodule demonstrates abnormal FDG avidity  on today's PET/CT, SUV max = 4.7. This is concerning for pulmonary  malignancy and follow-up  with bronchoscopy and biopsy has been  recommended.  2. Other tiny groundglass nodules in the lung apices primarily are  unchanged.  3. No evidence of intrathoracic lymphadenopathy is identified.  4. The thyroid gland is somewhat enlarged, thyroid ultrasound has been  recommended based on the finding on today's PET/CT. This is described in  a separate report.  5. Moderate volume of calcified plaque within the LAD coronary artery  distribution.     This report was signed and finalized on 7/11/2025 3:59 PM by Dr. Joe Childress MD.       Study Result    Narrative & Impression   EXAMINATION: NM PET/CT SKULL BASE TO MID THIGH- 7/11/2025 3:00 PM     HISTORY: Lung nodule.     Images are stored in PACS per institutional protocols and regulatory  requirements.     Dose:  1. 10.4 mCi FDG-18 intravenously, injected at the left hand at 1252  hours on the date of the examination. The patient's weight equals 167  pounds and serum glucose level 114 mg/dL. Uptake time equals 63 minutes.  2. 517 mGy centimeters for the CT portion of the examination. Automatic  exposure control was utilized.     REPORT: Multiplanar PET imaging was performed with the field-of-view  extending from the skull base through the thighs, fusion CT is also  obtained for attenuation correction purposes and localization.     COMPARISON: CT of the chest without contrast 7/11/2025. Outside CT of  the chest without contrast [facility]  6/20/2025     The visualized brain appears unremarkable. No abnormal activity is seen  in the head or neck. There is a focus of hypermetabolism along the  inferior margin of the left thyroid lobe, with a maximum SUV of 6.8. The  fusion CT shows no correlative mass in this region, follow-up with  thyroid ultrasound is recommended. For reference, the liver parenchyma  has background activity of 3.9. There is a hypermetabolic pulmonary  nodule in the right upper lobe which corresponds with the  finding on  previous noncontrast chest CT. This has a maximum SUV of 4.7 and  represents malignancy until proven otherwise. The nodule measures  approximately 9 mm on the PET/CT and on the corresponding comparison CT  examinations. No evidence of intrathoracic lymphadenopathy is  identified.     Below the diaphragm, physiologic activity is demonstrated within the  solid abdominal organs, gastrointestinal and urinary tracts. There is a  focus of activity along the anterior margin of the bladder, with a  maximum SUV of 7.7. There is a small corresponding area of increased  soft tissue attenuation, which could be related to a urachal remnant.  The activity in this region could be due to urine if the ureteral  remnant is contiguous with the bladder lumen, the possibility also  exists that a small urachal neoplasm is present at that location.  Follow-up with urology is recommended, with possible cystoscopy.     The fusion CT is reviewed separately: Moderate calcified plaque is noted  within the coronary arteries. This is greatest at the LAD. There are  calcified bilateral hilar lymph nodes compatible with healed  granulomatous disease. Cholecystectomy clips are present. Moderate  volume of calcified plaque is present within the abdominal aorta and  iliac arteries. There is diastases of the rectus abdominis musculature  with a small 1.5 cm periumbilical fat-containing ventral hernia.  Evidence of previous hysterectomy is noted.     IMPRESSION:  1. Abnormal PET/CT, there is a 9 mm noncalcified pulmonary nodule in the  right upper lobe, with a maximum SUV of 4.7, concerning for potential  pulmonary malignancy. Consider follow-up with bronchoscopy and biopsy.  No evidence of intrathoracic lymphadenopathy.  2. Small focus of activity along the inferior margin of the left thyroid  lobe with a maximum SUV of 6.8, no CT correlate. Consider follow-up with  ultrasound of the thyroid gland with careful evaluation for nodules  at  the inferior pole of the left thyroid lobe or the possibility of a  parathyroid nodule.  3. Anterior to the bladder there is a focus of FDG activity which  corresponds with an area of soft tissue, possibly a urachal remnant as  described above. Activity associated with a small neoplasm of a urachal  remnant is possible. Follow-up with urology is recommended, with  potential cystoscopy.     This report was signed and finalized on 7/11/2025 3:21 PM by Dr. Joe Childress MD.        Assessment:  1. Malignant neoplasm of upper lobe of right lung    2. Chronic obstructive pulmonary disease, unspecified COPD type    3. Sleep apnea, unspecified type    4. History of COVID-19    5. Non-seasonal allergic rhinitis, unspecified trigger    6. Primary lung adenocarcinoma, right    7. Restrictive lung disease    8. Lung nodule        Plan/Recommendations:    We appreciate help from Dr. Wright.  Patient has been diagnosed with adenocarcinoma of the lung which is a primary lung neoplasm with possible metastasis.  Although lobectomy is an option due to her lung function is well-preserved she has not decided to go for surgery.  SBRT or chemotherapy or immunotherapy will be discussed with Dr. Killian tomorrow.  The patient already had seen Dr. Rik Dunn from CT surgery.  Patient will continue Advair for chronic obstructive pulmonary disease and also use albuterol rescue as before.  She is not using albuterol nebulizer much.  She is a former smoker, did quit smoking inhaler.  Patient will continue using fluticasone nasal spray and loratadine for nasal allergy.  She is waiting for the sleep study and may need to start on CPAP treatment.  I advised the patient to return to the pulmonary clinic for a follow-up visit in 3 months time.  She may need further imaging studies of the lungs after visit with Dr. Killian and we will review that 1 during the next visit.  She will be returning earlier if needed.  She will need to continue  follow-up with her primary care provider and get her annual vaccinations.    Follow up:  3 Months    Time Spent:  30 minutes    I appreciate the opportunity of participating in this patient's care. I would like to thank the PCP for the referral.  Please feel free to contact me with any other questions.    Nicole Negrete MD   Pulmonologist/Intensivist     Electronically signed by: Nicole Negrete MD, 7/31/2025 11:43 CDT

## 2025-07-31 NOTE — LETTER
July 31, 2025     CHAVO Wright  [address] [place] [address]    Patient: Francesca Toribio   YOB: 1960   Date of Visit: 7/31/2025       Dear CHAVO Wright,    Francesca Toribio was in my office today. Below are the relevant portions of my assessment and plan of care.    Ms. Toribio is a 65-year-old female she presents to me with right upper lobe adenocarcinoma.  This was found incidentally with a hospital admission followed with serial CTs and large now 1 cm.  She is undergone bronchoscopic biopsy by Dr. Brennan and is consistent with poorly differentiated adenocarcinoma.  She also has some small groundglass opacities in the left upper lobe, these are all less than 8 mm but I do worry that they are early precancerous type or adenocarcinoma in situ type groundglass opacities.  She did have a 75-pack-year smoking history, quit 1 week ago and I congratulated her on this.  She had spirometry with diffusion capacity her PFTs on this show adequate lung reserve to tolerate anatomic lobectomy.  In discussions with her she does get short of breath with activity around her house and walking minimal distances, for example she could not walk up 2 flights of stairs and she has to take breaks while grocery shopping due to shortness of breath.  She does okay going to the bathroom at home but if she is to do cleaning she has to take breaks due to shortness of breath.    Discussed with Ms. Toribio and her daughter today the natural history of lung cancer treatment options.  We did discuss surgical lobectomy of the right upper lobe, she is not excited about the prospects of this so we did discuss treatment alternative of SBRT to the known lung malignancy nodule.  We discussed differences and cure rate with these 2 approaches but she wants to see and meet radiation oncology to further discuss possible SBRT.  She is worried that with surgery she would not be able to breathe as well afterwards, we  did discuss that is a risk and that she would likely get short of breath quicker than she does now but unlikely to lead to chronic oxygen use although cannot guarantee.  Given the location of this nodule could not perform wedge resection with adequate margin without performing anatomic lobectomy.    She has an appointment with Dr. Killian tomorrow for further workup for the malignancy.  Going to order a thyroid ultrasound given findings on PET and refer to ENT.  Also going to refer to radiation oncology to further discuss possible SBRT of the right upper lobe.  If this is not have an acceptable predicted outcome then she is a candidate for anatomic upper lobectomy with the risk of decreased breathing capacity afterwards.    Thank you for trusting with the care of Ms. Toribio.  Will continue to follow along.  Please do not hesitate to call questions or concerns.         Sincerely,        Rik Dunn MD        CC: Natacha Wright,

## 2025-07-31 NOTE — PROGRESS NOTES
Thoracic Surgery Consultation    Referring Physician: Dr. Melvin Wright    Primary Care Physician: CHAVO Barcenas    Chief Complaint   Patient presents with    Lung Cancer     New patient referred from Dr. Natacha Wright         Subjective     History of Present Illness  The patient presents for evaluation of lung spots.    She was diagnosed with lung spots following a CT scan during an ER visit last year due to severe illness and lethargy. The ER advised monitoring the condition and rechecking in a year, which has recently been done. She has no history of cancer or surgeries involving the heart, lungs, or chest. She has no known heart issues. She experiences shortness of breath during household chores such as cleaning and is unable to ascend two flights of stairs. She can perform grocery shopping without assistance but feels fatigued upon returning home. She produces a significant amount of mucus daily. She has a scheduled appointment with oncology tomorrow.    She quit smoking a week ago after a 50-year habit of 1.5 packs per day.    She is currently being evaluated for COPD.    She has not had an ultrasound of her thyroid.    SOCIAL HISTORY  The patient is a former smoker, having quit a week ago after smoking for 50 years at a rate of 1.5 packs per day.      Review of Systems   Constitutional:  Positive for fatigue. Negative for activity change and unexpected weight change.   Respiratory:  Positive for cough and shortness of breath. Negative for chest tightness.    Cardiovascular:  Negative for chest pain, palpitations and leg swelling.        A complete review of systems was performed, is negative except stated above.    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      User imported and matched from unrecognized problem [Helicobacter-associated Gastritis]    GERD (gastroesophageal reflux disease)     Goiter     Hyperlipemia     Hypothyroidism     Osteoporosis     Sleep apnea     Spine degeneration     Tinea corporis     Tobacco abuse     Torticollis      Past Surgical History:   Procedure Laterality Date    BRONCHOSCOPY WITH ION ROBOTIC ASSIST N/A 2025    Procedure: BRONCHOSCOPY WITH ION ROBOT and BRONCHOSCOPY WITH ENDOBRONCHIAL ULTRASOUND;  Surgeon: Natacha Wright DO;  Location: [facility] OR;  Service: Robotics - Pulmonary;  Laterality: N/A;  preop; lung nodule   postop lung nodule   PCP Jackie Franklin    CHOLECYSTECTOMY      HYSTERECTOMY      total    TUBAL ABDOMINAL LIGATION       Family History   Problem Relation Age of Onset    Heart failure Sister         congestive    Heart failure Brother         congestive     Social History     Tobacco Use    Smoking status: Former     Current packs/day: 0.00     Average packs/day: 1.5 packs/day for 52.6 years (78.8 ttl pk-yrs)     Types: Cigarettes     Start date:      Quit date: 2025     Years since quittin.0     Passive exposure: Current   Vaping Use    Vaping status: Never Used   Substance Use Topics    Drug use: Not Currently     Types: Marijuana     Comment: \"I did\"     Current Outpatient Medications   Medication Sig Dispense Refill    Accu-Chek Softclix Lancets lancets       albuterol (PROVENTIL) (2.5 MG/3ML) 0.083% nebulizer solution Take 2.5 mg by nebulization 4 (Four) Times a Day.      albuterol sulfate  (90 Base) MCG/ACT inhaler Inhale 2 puffs Every 4 (Four) Hours As Needed for Wheezing. 6.7 g 5    amitriptyline (ELAVIL) 10 MG tablet Take 1 tablet by mouth Every Night.      amLODIPine (NORVASC) 2.5 MG tablet Take 1 tablet by mouth Daily.      aspirin 81 MG EC tablet Take 1 tablet by mouth Daily.      Cholecalciferol 25 MCG (1000 UT) tablet " Take 1 tablet by mouth Daily.      Diclofenac Sodium (VOLTAREN) 1 % gel gel Apply 4 g topically to the appropriate area as directed 4 (Four) Times a Day.      Embecta Pen Needle Marjan 2 Gen 32G X 4 MM misc       ESCITALOPRAM OXALATE PO Take 20 mg by mouth Daily.      fluticasone (Flonase Allergy Relief) 50 MCG/ACT nasal spray Administer 2 sprays into the nostril(s) as directed by provider Daily. 16 g 5    Fluticasone-Salmeterol (ADVAIR/WIXELA) 250-50 MCG/ACT DISKUS Inhale 1 puff 2 (Two) Times a Day. 60 each 5    gemfibrozil (LOPID) 600 MG tablet Take 1 tablet by mouth 2 (Two) Times a Day.      glipizide (GLUCOTROL XL) 10 MG 24 hr tablet Take 1 tablet by mouth Daily.      glucose blood (Accu-Chek Soheila Plus) test strip 1 each by Other route Daily.      glucose blood (Accu-Chek Soheila Plus) test strip 1 each by Other route See Admin Instructions.      hydroCHLOROthiazide 25 MG tablet Take 1 tablet by mouth Daily.      ipratropium-albuterol (DUO-NEB) 0.5-2.5 mg/3 ml nebulizer Take 3 mL by nebulization 4 (Four) Times a Day.      lactulose (Constulose) 10 GM/15ML solution Take 15 mL by mouth As Needed.      levothyroxine (SYNTHROID, LEVOTHROID) 50 MCG tablet Take 1 tablet by mouth Daily.      lisinopril-hydrochlorothiazide (PRINZIDE,ZESTORETIC) 20-25 MG per tablet Take 1 tablet by mouth Daily.      loratadine (CLARITIN) 10 MG tablet Take 1 tablet by mouth Daily. 90 tablet 3    meloxicam (MOBIC) 15 MG tablet Take 1 tablet by mouth Daily.      metFORMIN ER (GLUCOPHAGE-XR) 500 MG 24 hr tablet Take 1 tablet by mouth Daily.      methocarbamol (ROBAXIN) 750 MG tablet Take 1 tablet by mouth 3 (Three) Times a Day As Needed for Muscle Spasms.      mirtazapine (REMERON) 45 MG tablet Take 1 tablet by mouth Daily.      NovoLOG FlexPen 100 UNIT/ML solution pen-injector sc pen Inject 5-10 Units under the skin into the appropriate area as directed. Patient has not picked this up yet from pharmacy      Omega-3 Fatty Acids (fish oil) 1000  "MG capsule capsule Take 1 capsule by mouth 3 (Three) Times a Day.      ondansetron ODT (ZOFRAN-ODT) 4 MG disintegrating tablet Place 2 tablets on the tongue Every 8 (Eight) Hours As Needed.      oxybutynin (DITROPAN) 5 MG tablet Take 1 tablet by mouth 2 (Two) Times a Day.      pantoprazole (PROTONIX) 40 MG EC tablet Take 1 tablet by mouth Daily.      metFORMIN ER (GLUCOPHAGE-XR) 500 MG 24 hr tablet Take 1 tablet by mouth 3 (Three) Times a Day.       No current facility-administered medications for this visit.     Allergies:  Ketorolac, Statins, and Morphine    Objective      Vital Signs  Visit Vitals  /66   Pulse 89   Ht 158 cm (62.21\")   Wt 71.8 kg (158 lb 3.2 oz)   SpO2 99%   BMI 28.74 kg/m²         Physical Exam  Vitals reviewed.   Constitutional:       General: She is not in acute distress.     Appearance: She is well-developed. She is not diaphoretic.   HENT:      Head: Normocephalic and atraumatic.   Eyes:      General: No scleral icterus.     Pupils: Pupils are equal, round, and reactive to light.   Neck:      Vascular: No JVD.      Trachea: No tracheal deviation.   Cardiovascular:      Rate and Rhythm: Normal rate and regular rhythm.      Heart sounds: Normal heart sounds. No murmur heard.  Pulmonary:      Effort: Pulmonary effort is normal. No respiratory distress.      Breath sounds: Normal breath sounds. No stridor. No wheezing.   Abdominal:      General: There is no distension.      Palpations: Abdomen is soft.      Tenderness: There is no abdominal tenderness.   Musculoskeletal:         General: No deformity. Normal range of motion.      Cervical back: Normal range of motion and neck supple.   Skin:     General: Skin is warm and dry.      Capillary Refill: Capillary refill takes less than 2 seconds.      Coloration: Skin is not pale.      Findings: No erythema or rash.   Neurological:      Mental Status: She is alert and oriented to person, place, and time.      Cranial Nerves: No cranial nerve " deficit.   Psychiatric:         Behavior: Behavior normal.         Thought Content: Thought content normal.         Judgment: Judgment normal.        Physical Exam      Results Review:     WBC   Date Value Ref Range Status   07/10/2025 9.08 3.40 - 10.80 10*3/mm3 Final   06/05/2019 11.1 (H) 5.0 - 10.0 K/uL Final     RBC   Date Value Ref Range Status   07/10/2025 4.72 3.77 - 5.28 10*6/mm3 Final   06/05/2019 4.68 4.10 - 5.40 M/uL Final     Hemoglobin   Date Value Ref Range Status   07/10/2025 13.9 12.0 - 15.9 g/dL Final   06/05/2019 13.6 12.0 - 16.0 g/dL Final     Hematocrit   Date Value Ref Range Status   07/10/2025 41.1 34.0 - 46.6 % Final   06/05/2019 40.7 37.0 - 47.0 % Final     MCV   Date Value Ref Range Status   07/10/2025 87.1 79.0 - 97.0 fL Final   06/05/2019 87 80.0 - 97.0 fL Final     MCH   Date Value Ref Range Status   07/10/2025 29.4 26.6 - 33.0 pg Final   06/05/2019 29.1 27.0 - 32.0 pg Final     MCHC   Date Value Ref Range Status   07/10/2025 33.8 31.5 - 35.7 g/dL Final   06/05/2019 33.4 32.0 - 36.0 g/dL Final     RDW   Date Value Ref Range Status   07/10/2025 13.1 12.3 - 15.4 % Final   06/05/2019 41.2 36.4 - 46.3 fL Final     RDW-SD   Date Value Ref Range Status   07/10/2025 41.5 37.0 - 54.0 fl Final     MPV   Date Value Ref Range Status   07/10/2025 11.0 6.0 - 12.0 fL Final   06/05/2019 10 7.4 - 10.4 fL Final     Platelets   Date Value Ref Range Status   07/10/2025 274 140 - 450 10*3/mm3 Final   06/05/2019 295 150 - 500 K/uL Final     Neutrophil Rel %   Date Value Ref Range Status   06/05/2019 49.4 (L) 50.0 - 75.0 % Final     Lymphocyte Rel %   Date Value Ref Range Status   06/05/2019 36.6 20.0 - 40.0 % Final     Monocyte Rel %   Date Value Ref Range Status   06/05/2019 5.9 0.0 - 6.0 % Final     Eosinophil %   Date Value Ref Range Status   06/05/2019 7.3 (H) 0.0 - 6.0 % Final     Basophil Rel %   Date Value Ref Range Status   06/05/2019 0.5 0.0 - 1.0 % Final     Immature Grans %   Date Value Ref Range  Status   06/05/2019 0.3 (H) <=0.00 % Final     Neutrophils Absolute   Date Value Ref Range Status   06/05/2019 5.5 2.5 - 7.5 K/uL Final     Lymphocytes Absolute   Date Value Ref Range Status   06/05/2019 4 1.0 - 4.0 K/uL Final     Monocytes Absolute   Date Value Ref Range Status   06/05/2019 0.65 (H) 0.05 - 0.60 K/uL Final     Eosinophils Absolute   Date Value Ref Range Status   06/05/2019 0.81 (H) 0.05 - 0.50 K/uL Final     Basophils Absolute   Date Value Ref Range Status   06/05/2019 0.05 0.00 - 0.10 K/uL Final     Immature Grans, Absolute   Date Value Ref Range Status   06/05/2019 0.03 (H) <=0.00 K/uL Final     External NRBCs   Date Value Ref Range Status   06/05/2019 0 0 - 2 /100 Final     Glucose   Date Value Ref Range Status   07/10/2025 166 (H) 65 - 99 mg/dL Final     Sodium   Date Value Ref Range Status   07/10/2025 140 136 - 145 mmol/L Final   10/14/2024 138 135 - 145 mmol/L Final     Potassium   Date Value Ref Range Status   07/10/2025 3.4 (L) 3.5 - 5.2 mmol/L Final   03/18/2025 2.8 (C) 3.5 - 5.0 mmol/L Final     CO2   Date Value Ref Range Status   07/10/2025 24.0 22.0 - 29.0 mmol/L Final     Total CO2   Date Value Ref Range Status   10/14/2024 25 21 - 32 mmol/L Final     Chloride   Date Value Ref Range Status   07/10/2025 98 98 - 107 mmol/L Final   10/14/2024 98 98 - 107 mmol/L Final     Anion Gap   Date Value Ref Range Status   07/10/2025 18.0 (H) 5.0 - 15.0 mmol/L Final   10/14/2024 15 6 - 16 mmol/L Final     Creatinine   Date Value Ref Range Status   07/10/2025 1.28 (H) 0.57 - 1.00 mg/dL Final   10/14/2024 0.93 0.60 - 1.30 mg/dL Final     BUN   Date Value Ref Range Status   07/10/2025 19.2 8.0 - 23.0 mg/dL Final   10/14/2024 19 (H) 7 - 18 mg/dL Final     BUN/Creatinine Ratio   Date Value Ref Range Status   07/10/2025 15.0 7.0 - 25.0 Final   10/14/2024 20.4 11.7 - 13.9 Final     Calcium   Date Value Ref Range Status   07/10/2025 10.0 8.6 - 10.5 mg/dL Final   10/14/2024 10.3 (H) 8.5 - 10.1 mg/dL Final      Comment:     Calcium measurements are adversely affected by the use of Omniscan during MRI. Analysis of calcium is not recommended for 12 to 24 hours after the use of the contrast agent.      eGFR Non  Am   Date Value Ref Range Status   10/14/2024 >60.0 >=60.0 mL/min/1.73m2 Final     Alkaline Phosphatase   Date Value Ref Range Status   10/14/2024 111 50 - 136 U/L Final     Total Protein   Date Value Ref Range Status   10/14/2024 8 6.4 - 8.2 g/dL Final     ALT (SGPT)   Date Value Ref Range Status   10/14/2024 17 16 - 62 U/L Final     AST (SGOT)   Date Value Ref Range Status   10/14/2024 21 7 - 34 U/L Final     Total Bilirubin   Date Value Ref Range Status   10/14/2024 0.3 0.0 - 1.0 mg/dL Final     Albumin   Date Value Ref Range Status   10/14/2024 4.2 3.4 - 5.0 g/dL Final        I reviewed the patient's clinical results and discussed with patient.    I personally reviewed CT scan chest the following is my interpretation:  Right upper lobe lateral 1 cm lung nodule, consistent with biopsy-proven malignancy, and left upper lobe there are multiple small groundglass opacities all less than 8 mm    I personally reviewed PET scan the following is my interpretation:  There is increased activity at the known right upper lobe lung malignancy, no significant activity in mediastinal or hilar nodes, there is increased activity in a left lower pole of the thyroid as well as had a urachal cyst near the bladder    PFTs:  Spirometry with Diffusion Capacity & Lung Volumes     Performed by: Zainab García, RRT  Authorized by: Nicole Negrete MD     Pre Drug % Predicted    FVC: 77%   FEV1: 83%   FEF 25-75%: 104%   FEV1/FVC: 84%   T%   RV: 118%   DLCO: 79%   D/VAsb: 114%      Assessment & Plan     Assessment & Plan    Ms. Toribio is a 65-year-old female she presents to me with right upper lobe adenocarcinoma.  This was found incidentally with a hospital admission followed with serial CTs and large now 1 cm.  She  is undergone bronchoscopic biopsy by Dr. Brennan and is consistent with poorly differentiated adenocarcinoma.  She also has some small groundglass opacities in the left upper lobe, these are all less than 8 mm but I do worry that they are early precancerous type or adenocarcinoma in situ type groundglass opacities.  She did have a 75-pack-year smoking history, quit 1 week ago and I congratulated her on this.  She had spirometry with diffusion capacity her PFTs on this show adequate lung reserve to tolerate anatomic lobectomy.  In discussions with her she does get short of breath with activity around her house and walking minimal distances, for example she could not walk up 2 flights of stairs and she has to take breaks while grocery shopping due to shortness of breath.  She does okay going to the bathroom at home but if she is to do cleaning she has to take breaks due to shortness of breath.    Discussed with Ms. Toribio and her daughter today the natural history of lung cancer treatment options.  We did discuss surgical lobectomy of the right upper lobe, she is not excited about the prospects of this so we did discuss treatment alternative of SBRT to the known lung malignancy nodule.  We discussed differences and cure rate with these 2 approaches but she wants to see and meet radiation oncology to further discuss possible SBRT.  She is worried that with surgery she would not be able to breathe as well afterwards, we did discuss that is a risk and that she would likely get short of breath quicker than she does now but unlikely to lead to chronic oxygen use although cannot guarantee.  Given the location of this nodule could not perform wedge resection with adequate margin without performing anatomic lobectomy.    She has an appointment with Dr. Killian tomorrow for further workup for the malignancy.  Going to order a thyroid ultrasound given findings on PET and refer to ENT.  Also going to refer to radiation oncology  to further discuss possible SBRT of the right upper lobe.  If this is not have an acceptable predicted outcome then she is a candidate for anatomic upper lobectomy with the risk of decreased breathing capacity afterwards.    Thank you for trusting with the care of Ms. Toribio.  Will continue to follow along.  Please do not hesitate to call questions or concerns.      Rik Dunn MD   Cardiothoracic Surgeon      Patient or patient representative verbalized consent for the use of Ambient Listening during the visit with  Rik Dunn MD for chart documentation. 7/31/2025  08:54 CDT

## 2025-08-01 ENCOUNTER — CONSULT (OUTPATIENT)
Age: 65
End: 2025-08-01
Payer: MEDICARE

## 2025-08-01 ENCOUNTER — CONSULT (OUTPATIENT)
Dept: ONCOLOGY | Facility: CLINIC | Age: 65
End: 2025-08-01
Payer: MEDICARE

## 2025-08-01 ENCOUNTER — LAB (OUTPATIENT)
Dept: LAB | Facility: HOSPITAL | Age: 65
End: 2025-08-01
Payer: MEDICARE

## 2025-08-01 VITALS
WEIGHT: 157 LBS | SYSTOLIC BLOOD PRESSURE: 142 MMHG | DIASTOLIC BLOOD PRESSURE: 68 MMHG | HEIGHT: 62 IN | BODY MASS INDEX: 28.89 KG/M2

## 2025-08-01 VITALS
WEIGHT: 157.6 LBS | BODY MASS INDEX: 29 KG/M2 | TEMPERATURE: 97 F | HEIGHT: 62 IN | HEART RATE: 85 BPM | OXYGEN SATURATION: 98 % | RESPIRATION RATE: 16 BRPM | DIASTOLIC BLOOD PRESSURE: 68 MMHG | SYSTOLIC BLOOD PRESSURE: 142 MMHG

## 2025-08-01 DIAGNOSIS — C34.11 PRIMARY CANCER OF RIGHT UPPER LOBE OF LUNG: Primary | ICD-10-CM

## 2025-08-01 DIAGNOSIS — C34.11 MALIGNANT NEOPLASM OF UPPER LOBE OF RIGHT LUNG: Primary | ICD-10-CM

## 2025-08-01 DIAGNOSIS — Z87.891 FORMER SMOKER: ICD-10-CM

## 2025-08-01 NOTE — LETTER
August 1, 2025     CHAVO Wright  [address] [address]    Patient: rFancesca Toribio   YOB: 1960   Date of Visit: 8/1/2025     Dear CHAVO Wright:       Thank you for referring Francesca Toribio to me for evaluation. Below are the relevant portions of my assessment and plan of care.    If you have questions, please do not hesitate to call me. I look forward to following Francesca along with you.         Sincerely,        Alfred Killian MD        CC: MD Natacha Patino DO Chua, Winston, MD  08/01/25 0908  Sign when Signing Visit  [facility] HEMATOLOGY & ONCOLOGY  [address]  [facility] [address]  [phone]    Patient Name: Francesca Toribio  Encounter Date: 08/01/2025  YOB: 1960  Patient Number: [id number]    Initial Note    REASON FOR CONSULTATION: Francesca Toribio is a 65 y.o. -American, female, previous smoker, 78 pack years,referred by Natacha Garcia* for management recommendations for adenocarcinoma of the right upper lobe of the the lung.  She is seen with daughter, Noemi and Fani by phone. History is obtained from patient. History is considered to be accurate.        HISTORY OF PRESENT ILLNESS: CT chest on 6/10/2024. No acute intrathoracic abnormality.  4 mm right upper lobe nodule (series 4, image 90). Follow-up per established guidelines.  Multiple thyroid nodules, some which have decreased in size.       CT chest 6/20/2025. Enlarging 9 mm solid right upper lobe nodule most consistent with bronchogenic carcinoma.  Numerous small groundglass opacities and small nodular opacities with interval enlargement of a semisolid opacity within the left lung apex also likely a very indolent adenocarcinoma.     Patient seen by Dr. Negrete on 7/3/2025 for lung nodule.  Recommend bronchoscopy.    Pulmonary function test 7/3/2025.The total lung capacity  is decreased supporting mild restrictive dysfunction and the residual volume is increased suggesting air trapping consistent with obstructive dysfunction. The diffusion capacity corrected for alveolar volume is above normal limits.    CBC 7/10/2025 showed no cytopenias.  BMP remarkable for GFR 46 ml/min.      PET 7/11/2025.  Abnormal PET/CT, there is a 9 mm noncalcified pulmonary nodule in the right upper lobe, with a maximum SUV of 4.7, concerning for potential pulmonary malignancy. Consider follow-up with bronchoscopy and biopsy. No evidence of intrathoracic lymphadenopathy.  Small focus of activity along the inferior margin of the left thyroid  lobe with a maximum SUV of 6.8, no CT correlate. Consider follow-up with ultrasound of the thyroid gland with careful evaluation for nodules at the inferior pole of the left thyroid lobe or the possibility of a parathyroid nodule. Anterior to the bladder there is a focus of FDG activity which corresponds with an area of soft tissue, possibly a urachal remnant as described above. Activity associated with a small neoplasm of a urachal remnant is possible. Follow-up with urology is recommended, with potential cystoscopy.      CT chest 7/11/2025.Solid pulmonary nodule in the right upper lobe with slightly irregular margins, measuring 10 mm, compared with approximately 9.3 mm on the outside chest CT. This nodule demonstrates abnormal FDG avidity on today's PET/CT, SUV max = 4.7. This is concerning for pulmonary malignancy and follow-up with bronchoscopy and biopsy has been recommended. Other tiny groundglass nodules in the lung apices primarily are unchanged. No evidence of intrathoracic lymphadenopathy is identified. The thyroid  gland is somewhat enlarged, thyroid ultrasound has been recommended based on the finding on today's PET/CT. This is described in a separate report.  Moderate volume of calcified plaque within the LAD coronary artery distribution.    Patient underwent  bronchoscopy with endobronchial ultrasound 7/17/2025 by Dr. Wright.      Pathology report 7/21/2025.   Lung, right upper lobe (Ion robotic-assisted bronchoscopic FNA):  Non-small cell carcinoma, favor adenocarcinoma.  2.    Lung, right upper lobe (Ion robotic-assisted bronchoscopic biopsy):  Poorly-differentiated lung adenocarcinoma. See comment.  Lung, right upper lobe (bronchoalveolar lavage):  Rare clusters of markedly atypical cells consistent with non-small cell carcinoma, favor adenocarcinoma.  Lymph node, station 11R (endobronchial ultrasound-guided FNA):  Polymorphous lymphocytes with a background of benign respiratory epithelium.  No malignancy identified.  The cytologic findings are similar in parts 1-3. The biopsy (part 2) shows a poorly differentiated non-small cell carcinoma.  The tumor cells are positive for pancytokeratin (AE1/AE3) and TTF-1 (8G7G3/1), and negative for p40. The morphology  along with these immunohistochemical findings are consistent with a poorly differentiated lung adenocarcinoma.    Seen by Dr. Dunn 7/31/2025. Candidate for lobectomy. Patient undecided about lobectomy or SBRT.     To see Dr. Mendez 8/1/2025.     No prior malignancy.     Sister had metastatic ovarian cancer.       LABS    Lab Results - Last 18 Months   Lab Units 07/10/25  1521   HEMOGLOBIN g/dL 13.9   HEMATOCRIT % 41.1   MCV fL 87.1   WBC 10*3/mm3 9.08   RDW % 13.1   MPV fL 11.0   PLATELETS 10*3/mm3 274       Lab Results - Last 18 Months   Lab Units 07/10/25  1521 03/18/25  2218 10/14/24  1517 10/02/24  0628 06/13/24  1957 06/10/24  2141   GLUCOSE mg/dL 166*  --   --   --   --   --    SODIUM mmol/L 140  --  138 137 140 137   POTASSIUM mmol/L 3.4* 2.8* 3.4* 3.8 3.1* 3.3*   TOTAL CO2 mmol/L  --   --  25 23 25 19*   CO2 mmol/L 24.0  --   --   --   --   --    CHLORIDE mmol/L 98  --  98 100 101 99   ANION GAP mmol/L 18.0*  --  15 14 14 19*   CREATININE mg/dL 1.28*  --  0.93 0.94 0.98 1.3   BUN mg/dL 19.2  --  19* 23* 19  "23   BUN / CREAT RATIO  15.0  --  20.4 24.5 19.4 17.7   CALCIUM mg/dL 10.0  --  10.3* 10.4* 10.2 9.8   EGFR IF NONAFRICN AM mL/min/1.73m2  --   --  >60.0 59.5* 56.7* 40.9*   ALK PHOS U/L  --   --  111 107  --  101   TOTAL PROTEIN g/dL  --   --  8 8  --  7.9   ALT (SGPT) U/L  --   --  17 10*  --  19   AST (SGOT) U/L  --   --  21 17  --  31   BILIRUBIN mg/dL  --   --  0.3 0.3  --  1   ALBUMIN g/dL  --   --  4.2 4  --  3.5       No results for input(s): \"MSPIKE\", \"KAPPALAMB\", \"IGLFLC\", \"URICACID\", \"FREEKAPPAL\", \"CEA\", \"LDH\", \"REFLABREPO\" in the last 58060 hours.    Lab Results - Last 18 Months   Lab Units 03/19/25  0456   TSH uIU/mL 0.31*         PAST MEDICAL HISTORY:  ALLERGIES:  Allergies   Allergen Reactions   • Ketorolac Swelling and Rash     Torodol   • Statins Myalgia     Muscle and joint pain   • Morphine Itching and Rash     CURRENT MEDICATIONS:  Outpatient Encounter Medications as of 8/1/2025   Medication Sig Dispense Refill   • Accu-Chek Softclix Lancets lancets      • albuterol (PROVENTIL) (2.5 MG/3ML) 0.083% nebulizer solution Take 2.5 mg by nebulization 4 (Four) Times a Day.     • albuterol sulfate  (90 Base) MCG/ACT inhaler Inhale 2 puffs Every 4 (Four) Hours As Needed for Wheezing. 6.7 g 5   • amitriptyline (ELAVIL) 10 MG tablet Take 1 tablet by mouth Every Night.     • amLODIPine (NORVASC) 2.5 MG tablet Take 1 tablet by mouth Daily.     • aspirin 81 MG EC tablet Take 1 tablet by mouth Daily.     • Cholecalciferol 25 MCG (1000 UT) tablet Take 1 tablet by mouth Daily.     • Diclofenac Sodium (VOLTAREN) 1 % gel gel Apply 4 g topically to the appropriate area as directed 4 (Four) Times a Day.     • Embecta Pen Needle Marjan 2 Gen 32G X 4 MM misc      • ESCITALOPRAM OXALATE PO Take 20 mg by mouth Daily.     • fluticasone (Flonase Allergy Relief) 50 MCG/ACT nasal spray Administer 2 sprays into the nostril(s) as directed by provider Daily. 16 g 5   • Fluticasone-Salmeterol (ADVAIR/WIXELA) 250-50 MCG/ACT " DISKUS Inhale 1 puff 2 (Two) Times a Day. 60 each 5   • gemfibrozil (LOPID) 600 MG tablet Take 1 tablet by mouth 2 (Two) Times a Day.     • glipizide (GLUCOTROL XL) 10 MG 24 hr tablet Take 1 tablet by mouth Daily.     • glucose blood (Accu-Chek Soheila Plus) test strip 1 each by Other route Daily.     • glucose blood (Accu-Chek Soheila Plus) test strip 1 each by Other route See Admin Instructions.     • hydroCHLOROthiazide 25 MG tablet Take 1 tablet by mouth Daily.     • ipratropium-albuterol (DUO-NEB) 0.5-2.5 mg/3 ml nebulizer Take 3 mL by nebulization 4 (Four) Times a Day.     • lactulose (Constulose) 10 GM/15ML solution Take 15 mL by mouth As Needed.     • levothyroxine (SYNTHROID, LEVOTHROID) 50 MCG tablet Take 1 tablet by mouth Daily.     • lisinopril-hydrochlorothiazide (PRINZIDE,ZESTORETIC) 20-25 MG per tablet Take 1 tablet by mouth Daily.     • loratadine (CLARITIN) 10 MG tablet Take 1 tablet by mouth Daily. 90 tablet 3   • meloxicam (MOBIC) 15 MG tablet Take 1 tablet by mouth Daily.     • metFORMIN ER (GLUCOPHAGE-XR) 500 MG 24 hr tablet Take 1 tablet by mouth Daily.     • metFORMIN ER (GLUCOPHAGE-XR) 500 MG 24 hr tablet Take 1 tablet by mouth 3 (Three) Times a Day.     • methocarbamol (ROBAXIN) 750 MG tablet Take 1 tablet by mouth 3 (Three) Times a Day As Needed for Muscle Spasms.     • mirtazapine (REMERON) 45 MG tablet Take 1 tablet by mouth Daily.     • NovoLOG FlexPen 100 UNIT/ML solution pen-injector sc pen Inject 5-10 Units under the skin into the appropriate area as directed. Patient has not picked this up yet from pharmacy     • Omega-3 Fatty Acids (fish oil) 1000 MG capsule capsule Take 1 capsule by mouth 3 (Three) Times a Day.     • ondansetron ODT (ZOFRAN-ODT) 4 MG disintegrating tablet Place 2 tablets on the tongue Every 8 (Eight) Hours As Needed.     • oxybutynin (DITROPAN) 5 MG tablet Take 1 tablet by mouth 2 (Two) Times a Day.     • pantoprazole (PROTONIX) 40 MG EC tablet Take 1 tablet by mouth  Daily.     • [DISCONTINUED] albuterol sulfate  (90 Base) MCG/ACT inhaler Inhale 2 puffs Every 6 (Six) Hours As Needed.     • [DISCONTINUED] Omega-3 Fatty Acids (Fish Oil Ultra) 1400 MG capsule Take 1 capsule by mouth Daily. (Patient not taking: Reported on 7/31/2025)     • [DISCONTINUED] OMEGA-3-ACID ETHYL ESTERS PO Take 1 mg by mouth 3 (Three) Times a Day. (Patient not taking: Reported on 7/31/2025)     • [DISCONTINUED] omeprazole (priLOSEC) 20 MG capsule Take 1 capsule by mouth Daily. (Patient not taking: Reported on 7/31/2025)     • [DISCONTINUED] pioglitazone (ACTOS) 30 MG tablet Take 1 tablet by mouth Daily.     • [DISCONTINUED] predniSONE (DELTASONE) 20 MG tablet Take 1 tablet by mouth Daily. (Patient not taking: Reported on 7/31/2025)     • [DISCONTINUED] simvastatin (ZOCOR) 40 MG tablet Take 1 tablet by mouth Daily. (Patient not taking: Reported on 7/31/2025)     • [DISCONTINUED] tolterodine (DETROL) 1 MG tablet Take 1 tablet by mouth. (Patient not taking: Reported on 7/31/2025)       No facility-administered encounter medications on file as of 8/1/2025.     ADULT ILLNESSES:  Patient Active Problem List   Diagnosis Code   • Hypertension I10   • Sleep apnea G47.30   • Chest pain R07.9   • Torticollis M43.6   • Tinea corporis B35.4   • Osteoarthritis M19.90   • Lung nodule R91.1   • Tobacco abuse Z72.0   • History of COVID-19 Z86.16   • COPD (chronic obstructive pulmonary disease) J44.9   • Non-seasonal allergic rhinitis J30.89   • Restrictive lung disease J98.4   • Abnormal CT of the chest R93.89   • Malignant neoplasm of upper lobe of right lung C34.11   • Primary lung adenocarcinoma, right C34.91   • Former smoker Z87.891     SURGERIES:  Past Surgical History:   Procedure Laterality Date   • BRONCHOSCOPY WITH ION ROBOTIC ASSIST N/A 7/17/2025    Procedure: BRONCHOSCOPY WITH ION ROBOT and BRONCHOSCOPY WITH ENDOBRONCHIAL ULTRASOUND;  Surgeon: Natacha Wright DO;  Location: [facility] OR;   Service: Robotics - Pulmonary;  Laterality: N/A;  preop; lung nodule   postop lung nodule   PCP Jackie Franklin   • CHOLECYSTECTOMY     • HYSTERECTOMY  1988    total   • TUBAL ABDOMINAL LIGATION  1980     HEALTH MAINTENANCE ITEMS:  Health Maintenance Due   Topic Date Due   • DXA SCAN  Never done   • DIABETIC FOOT EXAM  Never done   • DIABETIC EYE EXAM  Never done   • URINE MICROALBUMIN-CREATININE RATIO (uACR)  Never done   • ZOSTER VACCINE (1 of 2) Never done   • COVID-19 Vaccine (3 - 2024-25 season) 09/01/2024   • HEPATITIS C SCREENING  Never done   • ANNUAL WELLNESS VISIT  06/30/2025       <no information>  Last Completed Colonoscopy            Needs Review       COLORECTAL CANCER SCREENING (COLONOSCOPY - Every 10 Years) Tentatively due on 4/6/2027 04/06/2017  Outside Procedure: COLONOSCOPY                          Immunization History   Administered Date(s) Administered   • 31-influenza Vac Quardvalent Preservativ 10/21/2014, 10/23/2017   • COVID-19 (MODERNA) 1st,2nd,3rd Dose Monovalent 04/15/2021, 05/18/2021   • FLUAD TRI 65YR+ 10/25/2024   • Fluzone  >6mos 12/17/2016   • Fluzone (or Fluarix & Flulaval for VFC) >6mos 10/31/2022   • Fluzone High-Dose 65+yrs 10/30/2023   • Hepatitis B Adult/Adolescent IM 02/21/2014, 04/23/2014   • Influenza Seasonal Injectable 01/15/2017   • MMR 02/21/2014, 04/23/2014   • Pneumococcal Polysaccharide (PPSV23) 12/17/2016   • Tdap 10/31/2016     Last Completed Mammogram            Upcoming       MAMMOGRAM (Every 2 Years) Next due on 6/30/2027 06/30/2025  Mammo Diagnostic Bilateral With CAD    06/27/2024  Mammo Diagnostic Bilateral With CAD    03/27/2023  Mammo Diagnostic Bilateral With CAD    08/06/2020  Mammo Screening Digital Tomosynthesis Bilateral With CAD                              FAMILY HISTORY:  Family History   Problem Relation Age of Onset   • Heart failure Sister         congestive   • Heart failure Brother         congestive     SOCIAL HISTORY:  Social History  "    Socioeconomic History   • Marital status:    Tobacco Use   • Smoking status: Former     Current packs/day: 0.00     Average packs/day: 1.5 packs/day for 52.6 years (78.8 ttl pk-yrs)     Types: Cigarettes     Start date:      Quit date: 2025     Years since quittin.0     Passive exposure: Current   Vaping Use   • Vaping status: Never Used   Substance and Sexual Activity   • Drug use: Not Currently     Types: Marijuana     Comment: \"I did\"   • Sexual activity: Defer       REVIEW OF SYSTEMS:  Review of Systems   Constitutional:  Negative for fatigue and fever.        \"I feel fine.\"   HENT:  Negative for congestion and facial swelling.    Eyes:  Negative for redness and visual disturbance.   Respiratory:  Negative for shortness of breath and wheezing.    Cardiovascular:  Negative for chest pain and leg swelling.   Gastrointestinal:  Negative for constipation, diarrhea, nausea, vomiting and indigestion.   Endocrine: Negative for cold intolerance and heat intolerance.   Genitourinary:  Negative for difficulty urinating and dysuria.   Musculoskeletal:  Negative for gait problem.   Skin:  Negative for pallor.   Allergic/Immunologic: Negative for food allergies.   Neurological:  Negative for speech difficulty, weakness and confusion.   Hematological:  Negative for adenopathy. Bruises/bleeds easily.   Psychiatric/Behavioral:  Negative for agitation and hallucinations. The patient is not nervous/anxious.        /68   Pulse 85   Temp 97 °F (36.1 °C)   Resp 16   Ht 158 cm (62.21\")   Wt 71.5 kg (157 lb 9.6 oz)   SpO2 98%   Breastfeeding No   BMI 28.63 kg/m²  Body surface area is 1.73 meters squared.  Pain Score    25 0836   PainSc: 0-No pain       Physical Exam:  Physical Exam  Vitals reviewed.   Constitutional:       General: She is not in acute distress.  HENT:      Head: Normocephalic and atraumatic.   Eyes:      General: No scleral icterus.  Cardiovascular:      Rate and Rhythm: " "Normal rate.   Pulmonary:      Effort: No respiratory distress.      Breath sounds: No wheezing.   Abdominal:      General: Bowel sounds are normal.      Palpations: Abdomen is soft.   Musculoskeletal:         General: No swelling.   Skin:     Coloration: Skin is not pale.   Neurological:      Mental Status: She is alert and oriented to person, place, and time.   Psychiatric:         Mood and Affect: Mood normal.         Behavior: Behavior normal.         Thought Content: Thought content normal.         Judgment: Judgment normal.         Francesac Toribio reports a pain score of 0.          ASSESSMENT:  Adenocarcinoma the right upper lobe of the lung.  Tumor size 9 mm by PET.  AJCC stage:1A2 (cT1b, cN0, cM0, G3).  Treatment status: Pending lobectomy or SBRT.  2.  Performance status of 0.  3.  PET activity inferior margin of the left thyroid.Followed by Dr. Liu.   4.  Anterior to the bladder there is a focus of FDG activity which corresponds with an area of soft tissue, possibly a urachal remnant.  No unifying diagnosis. \"Will see Dr. Nunez August 28th.\"          PLAN:   regarding the reason for the referral.  Reviewed NCCN guidelines for stage IA2 disease.   2.   regarding staging with brain MRI.  3.   regarding option for lobectomy.  If patient decided against surgery.  Then to follow with radiation oncology for SBRT.  4.  Order brain MRI for staging  5.  Thyroid ultrasound 8/4/2025 due to PET activity inferior margin of the left thyroid.  6.  To see Dr. Nunez 8/28/2025 for activity anterior to the bladder.  7.  Anticipate surveillance post definitive therapy with imaging every 6 months for 3 years then annually thereafter.  8.  Blood for CBC differential and CMP next visit.  9.  Continue care per primary care physician and other specialists.  10.  Plan of care discussed with patient and daughters.  Understanding expressed.  Patient agreed to proceed.  11. Advance Care Planning  ACP " discussion was declined by the patient. Patient does not have an advance directive, information provided.   12.  Return to office in 4 weeks.  13.  Further recommendations pending.  14.  Work excuse for daughter.       Thank you for the referral.            I have reviewed the assessment and plan and verified the accuracy of it. No changes to assessment and plan since the information was documented. Alfred Killian MD 08/01/25           I spent 67 total minutes, face-to-face, caring for Francesca melendez. Greater than 50% of this time involved counseling and/or coordination of care as documented within this note.     (Nick Liu MD)  Thomas Wright MD/MD Rik Gates MD Raymond Wynn, MD Brandi Stacy, APRN

## 2025-08-01 NOTE — PROGRESS NOTES
[facility]  Radiation Oncology Clinic   Daniel Mendez MD, FACR  Dejon Gonzalez CHAVO  _______________________________________________  [facility]  Department of Radiation Oncology  [address] [place] [id number]  Office: [phone]  Fax: [phone]    DATE: 2025  PATIENT: Francesca Toribio  1960                         MEDICAL RECORD #: [id number]    REASON FOR VISIT:    Chief Complaint   Patient presents with    Lung Cancer     1. Malignant neoplasm of upper lobe of right lung    2. Former smoker                                              REASON FOR VISIT:    Chief Complaint   Patient presents with    Lung Cancer     Francesca Toribio is a 65 y.o. female that has been referred to our clinic to be evaluated for consideration of radiotherapy to the right lung for a progressively enlarging nodule that is now biopsy-proven to be poorly differentiated adenocarcinoma.    HISTORY OF PRESENT ILLNESS:  2025 - CT Chest without Contrast:  Enlarging 9 mm solid right upper lobe nodule most consistent with bronchogenic carcinoma.   Numerous small groundglass opacities and small nodular opacities with interval enlargement of a semisolid opacity within the left lung apex also likely a very indolent adenocarcinoma.     2025 - Pulmonary function Tests:  Pre Drug % Predicted   FVC: 77%  FEV1: 83%  FEF 25-75%: 104%  FEV1/FVC: 84%  T%  RV: 118%  DLCO: 79%  D/VAsb: 114%  Interpretation   Overall comments:   The above test results are acceptable and replacement by ATS criteria  From analysis of the above procedures patient showed evidence of moderate obstructive airway dysfunction.  This is confirmed by decreasing FEV1 although the FEF 25 to 75% is normal and the FEV1/FVC is 84.  No bronchodilator challenge was done.  The total lung capacity is decreased supporting mild restrictive dysfunction and the residual volume is increased suggesting  air trapping consistent with obstructive dysfunction.  The diffusion capacity corrected for alveolar volume is above normal limits    07/03/2025 - Appointment with Nicole Negrete MD:  Plan/Recommendations  I reviewed the CT scan reports from the isolated 2 films from outside hospital but according to the CT reports the patient has a right-sided enlarging lung nodule which is concerning for malignancy and due to her smoking history she definitely will need a workup for a tissue diagnosis and due to location of the nodule and an robotic navigational bronchoscopy is recommended and is scheduled with Dr. Wright in 2 weeks time.  Prebronchoscopy orders were placed.  Navigational bronchoscopy has been ordered.  The patient also will have a PET scan and a PFT done in the clinic today showed she has moderate obstructive airway dysfunction and mild restrictive dysfunction with normal diffusion Calcicard for alveolar volume.  I discussed the CT results and the PFT results with the patient and requested the outside hospital to send a full CT imaging link to the [facility] system in [facility].  Patient is currently using Flovent which is replaced with Advair and she will continue DuoNeb and albuterol rescue  Prescriptions were sent to the pharmacy.  She has nasal allergy and she will be started on fluticasone nasal spray and loratadine and prescription was sent to the pharmacy.  Patient has obstructive sleep apnea which is untreated and she used CPAP in the past but will need a new sleep study will need to start her on CPAP when results are available.  She will return to the pulmonary clinic in a month's time after the biopsy is done to discuss the test results and further treatment planning  Francesca Toribio  reports that she has been smoking cigarettes. She does not have any smokeless tobacco history on file. I have educated her on the risk of diseases from using tobacco products such as cancer, COPD, and heart disease. I  advised her to quit and she is willing to quit. We have discussed the following method/s for tobacco cessation:  Counseling.  Together we have set a quit date for 2 weeks from today.  She will follow up with me in 30 days or sooner to check on her progress.I spent 7 minutes counseling the patient.  Follow Up   1 month    07/11/2025 - PET Scan:  Abnormal PET/CT, there is a 9 mm noncalcified pulmonary nodule in the right upper lobe, with a maximum SUV of 4.7, concerning for potential pulmonary malignancy. Consider follow-up with bronchoscopy and biopsy. No evidence of intrathoracic lymphadenopathy.  Small focus of activity along the inferior margin of the left thyroid lobe with a maximum SUV of 6.8, no CT correlate. Consider follow-up with ultrasound of the thyroid gland with careful evaluation for nodules at the inferior pole of the left thyroid lobe or the possibility of a parathyroid nodule.  Anterior to the bladder there is a focus of FDG activity which corresponds with an area of soft tissue, possibly a urachal remnant as described above. Activity associated with a small neoplasm of a urachal remnant is possible. Follow-up with urology is recommended, with potential cystoscopy.    07/17/2025 - Bronchoscopy with biopsy per Natacha Wright, DO:  Lung, right upper lobe (Ion robotic-assisted bronchoscopic FNA):  Non-small cell carcinoma, favor adenocarcinoma.  Lung, right upper lobe (Ion robotic-assisted bronchoscopic biopsy):  Poorly-differentiated lung adenocarcinoma. See comment.  Lung, right upper lobe (bronchoalveolar lavage):  Rare clusters of markedly atypical cells consistent with non-small cell carcinoma, favor adenocarcinoma.  Lymph node, station 11R (endobronchial ultrasound-guided FNA):  Polymorphous lymphocytes with a background of benign respiratory epithelium.  No malignancy identified.    07/31/2025 - Appointment with Dr. Dunn:  Ms. Toribio is a 65-year-old female she presents to me with  right upper lobe adenocarcinoma.  This was found incidentally with a hospital admission followed with serial CTs and large now 1 cm.  She is undergone bronchoscopic biopsy by Dr. Brennan and is consistent with poorly differentiated adenocarcinoma.  She also has some small groundglass opacities in the left upper lobe, these are all less than 8 mm but I do worry that they are early precancerous type or adenocarcinoma in situ type groundglass opacities.  She did have a 75-pack-year smoking history, quit 1 week ago and I congratulated her on this.  She had spirometry with diffusion capacity her PFTs on this show adequate lung reserve to tolerate anatomic lobectomy.  In discussions with her she does get short of breath with activity around her house and walking minimal distances, for example she could not walk up 2 flights of stairs and she has to take breaks while grocery shopping due to shortness of breath.  She does okay going to the bathroom at home but if she is to do cleaning she has to take breaks due to shortness of breath.  Discussed with Ms. Toribio and her daughter today the natural history of lung cancer treatment options.  We did discuss surgical lobectomy of the right upper lobe, she is not excited about the prospects of this so we did discuss treatment alternative of SBRT to the known lung malignancy nodule.  We discussed differences and cure rate with these 2 approaches but she wants to see and meet radiation oncology to further discuss possible SBRT.  She is worried that with surgery she would not be able to breathe as well afterwards, we did discuss that is a risk and that she would likely get short of breath quicker than she does now but unlikely to lead to chronic oxygen use although cannot guarantee.  Given the location of this nodule could not perform wedge resection with adequate margin without performing anatomic lobectomy.  She has an appointment with Dr. Killian tomorrow for further workup for  the malignancy.  Going to order a thyroid ultrasound given findings on PET and refer to ENT.  Also going to refer to radiation oncology to further discuss possible SBRT of the right upper lobe.  If this is not have an acceptable predicted outcome then she is a candidate for anatomic upper lobectomy with the risk of decreased breathing capacity afterwards.    07/31/2025 - Appointment with Nicole Negrete MD:  Plan/Recommendations:  We appreciate help from Dr. Wright.  Patient has been diagnosed with adenocarcinoma of the lung which is a primary lung neoplasm with possible metastasis.  Although lobectomy is an option due to her lung function is well-preserved she has not decided to go for surgery.  SBRT or chemotherapy or immunotherapy will be discussed with Dr. Killian tomorrow.  The patient already had seen Dr. Rik Dunn from CT surgery.  Patient will continue Advair for chronic obstructive pulmonary disease and also use albuterol rescue as before.  She is not using albuterol nebulizer much.  She is a former smoker, did quit smoking inhaler.  Patient will continue using fluticasone nasal spray and loratadine for nasal allergy.  She is waiting for the sleep study and may need to start on CPAP treatment.  I advised the patient to return to the pulmonary clinic for a follow-up visit in 3 months time.  She may need further imaging studies of the lungs after visit with Dr. Killian and we will review that 1 during the next visit.  She will be returning earlier if needed.  She will need to continue follow-up with her primary care provider and get her annual vaccinations.  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 heilmannii      User imported and matched from unrecognized problem [Helicobacter-associated Gastritis]    GERD (gastroesophageal reflux disease)     Goiter     Hyperlipemia     Hypothyroidism     Lung cancer     Osteoporosis     Sleep apnea     Spine degeneration     Tinea corporis     Tobacco abuse     Torticollis       PAST SURGICAL HISTORY  Past Surgical History:   Procedure Laterality Date    BRONCHOSCOPY WITH ION ROBOTIC ASSIST N/A 2025    Procedure: BRONCHOSCOPY WITH ION ROBOT and BRONCHOSCOPY WITH ENDOBRONCHIAL ULTRASOUND;  Surgeon: Natacha Wright DO;  Location: [facility] OR;  Service: Robotics - Pulmonary;  Laterality: N/A;  preop; lung nodule   postop lung nodule   PCP Jackie Franklin    CHOLECYSTECTOMY      HYSTERECTOMY      total    TUBAL ABDOMINAL LIGATION        FAMILY HISTORY  family history includes Heart failure in her brother and sister.    SOCIAL HISTORY  Social History     Tobacco Use    Smoking status: Former     Current packs/day: 0.00     Average packs/day: 1.5 packs/day for 52.6 years (78.8 ttl pk-yrs)     Types: Cigarettes     Start date:      Quit date: 2025     Years since quittin.0     Passive exposure: Current   Vaping Use    Vaping status: Never Used   Substance Use Topics    Drug use: Not Currently     Types: Marijuana     Comment: \"I did\"     ALLERGIES  Ketorolac, Statins, and Morphine     MEDICATIONS    Current Outpatient Medications:     Accu-Chek Softclix Lancets lancets, , Disp: , Rfl:     albuterol (PROVENTIL) (2.5 MG/3ML) 0.083% nebulizer solution, Take 2.5 mg by nebulization 4 (Four) Times a Day., Disp: , Rfl:     albuterol sulfate  (90 Base) MCG/ACT inhaler, Inhale 2 puffs Every 4 (Four) Hours As Needed for Wheezing., Disp: 6.7 g, Rfl: 5    amitriptyline (ELAVIL) 10 MG tablet, Take 1 tablet by mouth Every Night., Disp: , Rfl:     amLODIPine (NORVASC) 2.5 MG tablet, Take 1 tablet by mouth " Daily., Disp: , Rfl:     aspirin 81 MG EC tablet, Take 1 tablet by mouth Daily., Disp: , Rfl:     Cholecalciferol 25 MCG (1000 UT) tablet, Take 1 tablet by mouth Daily., Disp: , Rfl:     Diclofenac Sodium (VOLTAREN) 1 % gel gel, Apply 4 g topically to the appropriate area as directed 4 (Four) Times a Day., Disp: , Rfl:     Embecta Pen Needle Marjan 2 Gen 32G X 4 MM misc, , Disp: , Rfl:     ESCITALOPRAM OXALATE PO, Take 20 mg by mouth Daily., Disp: , Rfl:     fluticasone (Flonase Allergy Relief) 50 MCG/ACT nasal spray, Administer 2 sprays into the nostril(s) as directed by provider Daily., Disp: 16 g, Rfl: 5    Fluticasone-Salmeterol (ADVAIR/WIXELA) 250-50 MCG/ACT DISKUS, Inhale 1 puff 2 (Two) Times a Day., Disp: 60 each, Rfl: 5    gemfibrozil (LOPID) 600 MG tablet, Take 1 tablet by mouth 2 (Two) Times a Day., Disp: , Rfl:     glipizide (GLUCOTROL XL) 10 MG 24 hr tablet, Take 1 tablet by mouth Daily., Disp: , Rfl:     glucose blood (Accu-Chek Soheila Plus) test strip, 1 each by Other route Daily., Disp: , Rfl:     glucose blood (Accu-Chek Soheila Plus) test strip, 1 each by Other route See Admin Instructions., Disp: , Rfl:     hydroCHLOROthiazide 25 MG tablet, Take 1 tablet by mouth Daily., Disp: , Rfl:     ipratropium-albuterol (DUO-NEB) 0.5-2.5 mg/3 ml nebulizer, Take 3 mL by nebulization 4 (Four) Times a Day., Disp: , Rfl:     lactulose (Constulose) 10 GM/15ML solution, Take 15 mL by mouth As Needed., Disp: , Rfl:     levothyroxine (SYNTHROID, LEVOTHROID) 50 MCG tablet, Take 1 tablet by mouth Daily., Disp: , Rfl:     lisinopril-hydrochlorothiazide (PRINZIDE,ZESTORETIC) 20-25 MG per tablet, Take 1 tablet by mouth Daily., Disp: , Rfl:     loratadine (CLARITIN) 10 MG tablet, Take 1 tablet by mouth Daily., Disp: 90 tablet, Rfl: 3    meloxicam (MOBIC) 15 MG tablet, Take 1 tablet by mouth Daily., Disp: , Rfl:     metFORMIN ER (GLUCOPHAGE-XR) 500 MG 24 hr tablet, Take 1 tablet by mouth Daily., Disp: , Rfl:     metFORMIN ER  "(GLUCOPHAGE-XR) 500 MG 24 hr tablet, Take 1 tablet by mouth 3 (Three) Times a Day., Disp: , Rfl:     methocarbamol (ROBAXIN) 750 MG tablet, Take 1 tablet by mouth 3 (Three) Times a Day As Needed for Muscle Spasms., Disp: , Rfl:     mirtazapine (REMERON) 45 MG tablet, Take 1 tablet by mouth Daily., Disp: , Rfl:     NovoLOG FlexPen 100 UNIT/ML solution pen-injector sc pen, Inject 5-10 Units under the skin into the appropriate area as directed. Patient has not picked this up yet from pharmacy, Disp: , Rfl:     Omega-3 Fatty Acids (fish oil) 1000 MG capsule capsule, Take 1 capsule by mouth 3 (Three) Times a Day., Disp: , Rfl:     ondansetron ODT (ZOFRAN-ODT) 4 MG disintegrating tablet, Place 2 tablets on the tongue Every 8 (Eight) Hours As Needed., Disp: , Rfl:     oxybutynin (DITROPAN) 5 MG tablet, Take 1 tablet by mouth 2 (Two) Times a Day., Disp: , Rfl:     pantoprazole (PROTONIX) 40 MG EC tablet, Take 1 tablet by mouth Daily., Disp: , Rfl:     The following portions of the patient's history were reviewed and updated as appropriate: allergies, current medications, past family history, past medical history, past social history, past surgical history and problem list.    REVIEW OF SYSTEMS  Review of Systems   Constitutional:  Negative for appetite change, fatigue and unexpected weight change.   HENT:  Negative.     Eyes: Negative.         Glasses   Respiratory:  Negative for cough, hemoptysis and shortness of breath.    Cardiovascular: Negative.    Gastrointestinal: Negative.    Endocrine: Negative.    Genitourinary: Negative.     Musculoskeletal: Negative.    Skin: Negative.    Neurological: Negative.  Negative for dizziness, headaches and light-headedness.   Hematological: Negative.    Psychiatric/Behavioral: Negative.       Implant: NO    PHYSICAL EXAM  VITAL SIGNS:   Vitals:    08/01/25 0945   BP: 142/68   Weight: 71.2 kg (157 lb)   Height: 158 cm (62.21\")   PainSc: 0-No pain     Physical Exam  Vitals and nursing " note reviewed. Exam conducted with a chaperone present.   Constitutional:       General: She is not in acute distress.     Appearance: Normal appearance. She is normal weight.   HENT:      Head: Normocephalic.   Cardiovascular:      Rate and Rhythm: Normal rate and regular rhythm.      Heart sounds: Normal heart sounds.   Pulmonary:      Effort: Pulmonary effort is normal. No respiratory distress.      Breath sounds: Normal breath sounds.   Abdominal:      General: There is no distension.      Palpations: Abdomen is soft.      Tenderness: There is no abdominal tenderness.   Musculoskeletal:         General: Normal range of motion.      Cervical back: Normal range of motion.      Right lower leg: No edema.      Left lower leg: No edema.   Lymphadenopathy:      Cervical: No cervical adenopathy.   Neurological:      General: No focal deficit present.      Mental Status: She is alert and oriented to person, place, and time.   Psychiatric:         Attention and Perception: Attention and perception normal.         Mood and Affect: Mood normal. Affect is tearful.         Speech: Speech is delayed.         Behavior: Behavior is withdrawn. Behavior is cooperative.         Thought Content: Thought content normal.         Cognition and Memory: Cognition is impaired. Memory is impaired.         Judgment: Judgment normal.      Comments: Much of the verbal history was provided by the patient's daughter, but validated and supported with some prompting of the patient.          Performance Status: ECOG (0) Fully active, able to carry on all predisease performance without restriction    Clinical Quality Measures  - Pain Documented by Standardized Tool, JAREN  Francesca Bishopbrook reports a pain score of 0. Given her pain assessment as noted, treatment options were discussed and the following options were decided upon as a follow-up plan to address the patient's pain: No pain, no plan given.    - Body Mass Index Screening and Follow-Up  Plan  BMI is >= 25 and <30. (Overweight) The following options were offered after discussion;: none (medical contraindication)    - Tobacco Use: Screening and Cessation Intervention  Social History    Tobacco Use      Smoking status: Former        Packs/day: 0.00        Years: 1.5 packs/day for 52.6 years (78.8 ttl pk-yrs)        Types: Cigarettes        Start date:         Quit date: 2025        Years since quittin.0        Passive exposure: Current      Smokeless tobacco: Not on file    - Advanced Care Planning Advance Care Planning  ACP discussion was held with the patient during this visit. Patient does not have an advance directive, information provided.    - PHQ-2 Depression Screening:  Little interest or pleasure in doing things? Not at all   Feeling down, depressed, or hopeless? Not at all   PHQ-2 Total Score 0     ASSESSMENT AND PLAN  1. Malignant neoplasm of upper lobe of right lung    2. Former smoker        RECOMMENDATIONS: Francesca Toribio is a 65-year-old female diagnosed with an incidentally found Stage IA1 (cT1a, cN0, cM0) poorly differentiated adenocarcinoma of the right upper lung.  She remains asymptomatic of this clinically progressive lesion.  She has poorly managed diabetes mellitus for which she is recently receiving updated treatment.  The patient is a surgical candidate but would like to pursue nonsurgical options.  We recommended definitive therapy using SBRT.     There are some noticeable cognitive changes which may have a psychosocial basis as the patient's family reports they are chronic.  She is pending an MRI ordered by Dr. Spencer to evaluate for metastatic disease.    The indications and rationale of lung stereotactic radiation therapy (SBRT) according to the NCCN Guidelines has been discussed today. I have extensively reviewed the risks, benefits and alternatives of therapy with this diagnosis. The risks of radiation therapy includes but is not limited to radiation  induced pulmonary fibrosis, progression of disease in spite of therapy with either local or systemic failure. I have seen, examined and reviewed this patient's medication list, appropriate labs and imaging studies as well as other physician notes. We discussed the goals and plans of care with the patient and family and answered all questions.     Pulmonary function tests have been reviewed.  Patient reports that she recently discontinued smoking.  We supported, counseled and recommended that she continue ongoing smoking cessation.    Following this discussion and in consideration of the diagnostic data/evaluation of the patient, I recommended a course of stereotactic radiosurgery to hypermetabolic right upper lung tumor.  I anticipate 1275-5894 cGy over 3-5 treatments. Will simulate treatment fields today, 4D CT with MIPS to begin the planning process, final course pending.  Continue ongoing management per primary care physician and other specialists. Thank you for allowing me to assist in this patients care.     Return in 4 days (on 8/5/2025) for CT Simulation.     Time Spent: I spent 60 minutes caring for Francesca on this date of service. This time includes time spent by me in the following activities: preparing for the visit, reviewing tests, obtaining and/or reviewing a separately obtained history, performing a medically appropriate examination and/or evaluation, counseling and educating the patient/family/caregiver, ordering medications, tests, or procedures, documenting information in the medical record, independently interpreting results and communicating that information with the patient/family/caregiver, and care coordination.   Kendrick Mendez MD  08/01/2025

## 2025-08-05 ENCOUNTER — HOSPITAL ENCOUNTER (OUTPATIENT)
Dept: RADIATION ONCOLOGY | Facility: HOSPITAL | Age: 65
Discharge: HOME | End: 2025-08-05

## 2025-08-05 ENCOUNTER — OFFICE VISIT (OUTPATIENT)
Dept: OTOLARYNGOLOGY | Facility: CLINIC | Age: 65
End: 2025-08-05
Payer: MEDICARE

## 2025-08-05 VITALS
HEIGHT: 62 IN | DIASTOLIC BLOOD PRESSURE: 69 MMHG | BODY MASS INDEX: 28.89 KG/M2 | WEIGHT: 157 LBS | SYSTOLIC BLOOD PRESSURE: 124 MMHG

## 2025-08-05 DIAGNOSIS — R94.8 ABNORMAL POSITRON EMISSION TOMOGRAPHY (PET) SCAN: ICD-10-CM

## 2025-08-05 DIAGNOSIS — E04.2 MULTIPLE THYROID NODULES: ICD-10-CM

## 2025-08-05 DIAGNOSIS — E04.1 THYROID NODULE: Primary | ICD-10-CM

## 2025-08-05 RX ORDER — ACYCLOVIR 400 MG/1
TABLET ORAL
COMMUNITY
Start: 2025-08-01

## 2025-08-14 ENCOUNTER — TELEPHONE (OUTPATIENT)
Dept: PULMONOLOGY | Facility: CLINIC | Age: 65
End: 2025-08-14
Payer: MEDICARE

## 2025-08-14 LAB
FUNGUS WND CULT: NORMAL
Lab: ABNORMAL
Lab: ABNORMAL
MYCOBACTERIUM SPEC CULT: ABNORMAL
NIGHT BLUE STAIN TISS: ABNORMAL
NIGHT BLUE STAIN TISS: ABNORMAL

## 2025-08-18 ENCOUNTER — HOSPITAL ENCOUNTER (OUTPATIENT)
Dept: RADIATION ONCOLOGY | Facility: HOSPITAL | Age: 65
Discharge: HOME | End: 2025-08-18
Payer: MEDICARE

## 2025-08-18 ENCOUNTER — TELEPHONE (OUTPATIENT)
Dept: INTERVENTIONAL RADIOLOGY/VASCULAR | Facility: HOSPITAL | Age: 65
End: 2025-08-18
Payer: MEDICARE

## 2025-08-18 VITALS — HEART RATE: 99 BPM | SYSTOLIC BLOOD PRESSURE: 156 MMHG | DIASTOLIC BLOOD PRESSURE: 63 MMHG | OXYGEN SATURATION: 99 %

## 2025-08-18 LAB
RAD ONC ARIA COURSE ID: NORMAL
RAD ONC ARIA COURSE INTENT: NORMAL
RAD ONC ARIA COURSE LAST TREATMENT DATE: NORMAL
RAD ONC ARIA COURSE START DATE: NORMAL
RAD ONC ARIA COURSE TREATMENT ELAPSED DAYS: 0
RAD ONC ARIA FIRST TREATMENT DATE: NORMAL
RAD ONC ARIA PLAN FRACTIONS TREATED TO DATE: 1
RAD ONC ARIA PLAN ID: NORMAL
RAD ONC ARIA PLAN PRESCRIBED DOSE PER FRACTION: 12.5 GY
RAD ONC ARIA PLAN PRIMARY REFERENCE POINT: NORMAL
RAD ONC ARIA PLAN TOTAL FRACTIONS PRESCRIBED: 4
RAD ONC ARIA PLAN TOTAL PRESCRIBED DOSE: 5000 CGY
RAD ONC ARIA REFERENCE POINT DOSAGE GIVEN TO DATE: 12.5 GY
RAD ONC ARIA REFERENCE POINT ID: NORMAL
RAD ONC ARIA REFERENCE POINT SESSION DOSAGE GIVEN: 12.5 GY

## 2025-08-19 ENCOUNTER — HOSPITAL ENCOUNTER (OUTPATIENT)
Dept: ULTRASOUND IMAGING | Facility: HOSPITAL | Age: 65
Discharge: HOME | End: 2025-08-19
Admitting: NURSE PRACTITIONER
Payer: MEDICARE

## 2025-08-19 ENCOUNTER — HOSPITAL ENCOUNTER (OUTPATIENT)
Dept: RADIATION ONCOLOGY | Facility: HOSPITAL | Age: 65
Discharge: HOME | End: 2025-08-19

## 2025-08-19 VITALS — OXYGEN SATURATION: 100 % | DIASTOLIC BLOOD PRESSURE: 67 MMHG | HEART RATE: 91 BPM | SYSTOLIC BLOOD PRESSURE: 132 MMHG

## 2025-08-19 DIAGNOSIS — E04.1 THYROID NODULE: ICD-10-CM

## 2025-08-19 DIAGNOSIS — E04.2 MULTIPLE THYROID NODULES: ICD-10-CM

## 2025-08-19 DIAGNOSIS — R94.8 ABNORMAL POSITRON EMISSION TOMOGRAPHY (PET) SCAN: ICD-10-CM

## 2025-08-19 LAB
RAD ONC ARIA COURSE ID: NORMAL
RAD ONC ARIA COURSE INTENT: NORMAL
RAD ONC ARIA COURSE LAST TREATMENT DATE: NORMAL
RAD ONC ARIA COURSE START DATE: NORMAL
RAD ONC ARIA COURSE TREATMENT ELAPSED DAYS: 1
RAD ONC ARIA FIRST TREATMENT DATE: NORMAL
RAD ONC ARIA PLAN FRACTIONS TREATED TO DATE: 2
RAD ONC ARIA PLAN ID: NORMAL
RAD ONC ARIA PLAN PRESCRIBED DOSE PER FRACTION: 12.5 GY
RAD ONC ARIA PLAN PRIMARY REFERENCE POINT: NORMAL
RAD ONC ARIA PLAN TOTAL FRACTIONS PRESCRIBED: 4
RAD ONC ARIA PLAN TOTAL PRESCRIBED DOSE: 5000 CGY
RAD ONC ARIA REFERENCE POINT DOSAGE GIVEN TO DATE: 25 GY
RAD ONC ARIA REFERENCE POINT ID: NORMAL
RAD ONC ARIA REFERENCE POINT SESSION DOSAGE GIVEN: 12.5 GY

## 2025-08-19 PROCEDURE — 88172 CYTP DX EVAL FNA 1ST EA SITE: CPT | Performed by: NURSE PRACTITIONER

## 2025-08-19 PROCEDURE — 76942 ECHO GUIDE FOR BIOPSY: CPT

## 2025-08-19 PROCEDURE — 88112 CYTOPATH CELL ENHANCE TECH: CPT | Performed by: NURSE PRACTITIONER

## 2025-08-19 PROCEDURE — 88177 CYTP FNA EVAL EA ADDL: CPT | Performed by: NURSE PRACTITIONER

## 2025-08-19 RX ORDER — LIDOCAINE HYDROCHLORIDE 10 MG/ML
5 INJECTION, SOLUTION INFILTRATION; PERINEURAL ONCE
Status: DISPENSED | OUTPATIENT
Start: 2025-08-19

## 2025-08-20 ENCOUNTER — HOSPITAL ENCOUNTER (OUTPATIENT)
Dept: RADIATION ONCOLOGY | Facility: HOSPITAL | Age: 65
Discharge: HOME | End: 2025-08-20

## 2025-08-20 VITALS — SYSTOLIC BLOOD PRESSURE: 147 MMHG | OXYGEN SATURATION: 98 % | DIASTOLIC BLOOD PRESSURE: 73 MMHG | HEART RATE: 97 BPM

## 2025-08-20 LAB
BEAKER LAB AP INTRAOPERATIVE CONSULTATION: NORMAL
CYTO UR: NORMAL
LAB AP CASE REPORT: NORMAL
LAB AP CLINICAL INFORMATION: NORMAL
Lab: NORMAL
PATH REPORT.FINAL DX SPEC: NORMAL
PATH REPORT.GROSS SPEC: NORMAL
RAD ONC ARIA COURSE ID: NORMAL
RAD ONC ARIA COURSE INTENT: NORMAL
RAD ONC ARIA COURSE LAST TREATMENT DATE: NORMAL
RAD ONC ARIA COURSE START DATE: NORMAL
RAD ONC ARIA COURSE TREATMENT ELAPSED DAYS: 2
RAD ONC ARIA FIRST TREATMENT DATE: NORMAL
RAD ONC ARIA PLAN FRACTIONS TREATED TO DATE: 3
RAD ONC ARIA PLAN ID: NORMAL
RAD ONC ARIA PLAN PRESCRIBED DOSE PER FRACTION: 12.5 GY
RAD ONC ARIA PLAN PRIMARY REFERENCE POINT: NORMAL
RAD ONC ARIA PLAN TOTAL FRACTIONS PRESCRIBED: 4
RAD ONC ARIA PLAN TOTAL PRESCRIBED DOSE: 5000 CGY
RAD ONC ARIA REFERENCE POINT DOSAGE GIVEN TO DATE: 37.5 GY
RAD ONC ARIA REFERENCE POINT ID: NORMAL
RAD ONC ARIA REFERENCE POINT SESSION DOSAGE GIVEN: 12.5 GY

## 2025-08-21 ENCOUNTER — HOSPITAL ENCOUNTER (OUTPATIENT)
Dept: RADIATION ONCOLOGY | Facility: HOSPITAL | Age: 65
Discharge: HOME | End: 2025-08-21

## 2025-08-21 VITALS — OXYGEN SATURATION: 99 % | SYSTOLIC BLOOD PRESSURE: 96 MMHG | HEART RATE: 91 BPM | DIASTOLIC BLOOD PRESSURE: 60 MMHG

## 2025-08-21 DIAGNOSIS — C34.11 MALIGNANT NEOPLASM OF UPPER LOBE OF RIGHT LUNG: Primary | ICD-10-CM

## 2025-08-21 LAB
RAD ONC ARIA COURSE ID: NORMAL
RAD ONC ARIA COURSE INTENT: NORMAL
RAD ONC ARIA COURSE LAST TREATMENT DATE: NORMAL
RAD ONC ARIA COURSE START DATE: NORMAL
RAD ONC ARIA COURSE TREATMENT ELAPSED DAYS: 3
RAD ONC ARIA FIRST TREATMENT DATE: NORMAL
RAD ONC ARIA PLAN FRACTIONS TREATED TO DATE: 4
RAD ONC ARIA PLAN ID: NORMAL
RAD ONC ARIA PLAN PRESCRIBED DOSE PER FRACTION: 12.5 GY
RAD ONC ARIA PLAN PRIMARY REFERENCE POINT: NORMAL
RAD ONC ARIA PLAN TOTAL FRACTIONS PRESCRIBED: 4
RAD ONC ARIA PLAN TOTAL PRESCRIBED DOSE: 5000 CGY
RAD ONC ARIA REFERENCE POINT DOSAGE GIVEN TO DATE: 50 GY
RAD ONC ARIA REFERENCE POINT ID: NORMAL
RAD ONC ARIA REFERENCE POINT SESSION DOSAGE GIVEN: 12.5 GY

## 2025-08-25 LAB
FUNGUS WND CULT: NORMAL
RAD ONC ARIA COURSE END DATE: NORMAL
RAD ONC ARIA COURSE ID: NORMAL
RAD ONC ARIA COURSE INTENT: NORMAL
RAD ONC ARIA COURSE LAST TREATMENT DATE: NORMAL
RAD ONC ARIA COURSE START DATE: NORMAL
RAD ONC ARIA COURSE TREATMENT ELAPSED DAYS: 3
RAD ONC ARIA FIRST TREATMENT DATE: NORMAL
RAD ONC ARIA PLAN FRACTIONS TREATED TO DATE: 4
RAD ONC ARIA PLAN ID: NORMAL
RAD ONC ARIA PLAN NAME: NORMAL
RAD ONC ARIA PLAN PRESCRIBED DOSE PER FRACTION: 12.5 GY
RAD ONC ARIA PLAN PRIMARY REFERENCE POINT: NORMAL
RAD ONC ARIA PLAN TOTAL FRACTIONS PRESCRIBED: 4
RAD ONC ARIA PLAN TOTAL PRESCRIBED DOSE: 5000 CGY
RAD ONC ARIA REFERENCE POINT DOSAGE GIVEN TO DATE: 50 GY
RAD ONC ARIA REFERENCE POINT ID: NORMAL

## 2025-08-26 ENCOUNTER — TELEPHONE (OUTPATIENT)
Dept: INTERVENTIONAL RADIOLOGY/VASCULAR | Facility: HOSPITAL | Age: 65
End: 2025-08-26
Payer: MEDICARE

## 2025-08-27 ENCOUNTER — TELEPHONE (OUTPATIENT)
Dept: ONCOLOGY | Facility: CLINIC | Age: 65
End: 2025-08-27
Payer: MEDICARE

## 2025-08-28 ENCOUNTER — OFFICE VISIT (OUTPATIENT)
Dept: UROLOGY | Facility: CLINIC | Age: 65
End: 2025-08-28
Payer: MEDICARE

## 2025-08-28 ENCOUNTER — PATIENT ROUNDING (BHMG ONLY) (OUTPATIENT)
Dept: UROLOGY | Facility: CLINIC | Age: 65
End: 2025-08-28
Payer: MEDICARE

## 2025-08-28 VITALS — BODY MASS INDEX: 30.47 KG/M2 | WEIGHT: 165.6 LBS | TEMPERATURE: 97.1 F | HEIGHT: 62 IN

## 2025-08-28 DIAGNOSIS — R19.00 PELVIC MASS IN FEMALE: ICD-10-CM

## 2025-08-28 DIAGNOSIS — N32.3 BLADDER DIVERTICULUM: Primary | ICD-10-CM

## 2025-08-28 PROCEDURE — 99204 OFFICE O/P NEW MOD 45 MIN: CPT | Performed by: UROLOGY

## 2025-08-28 PROCEDURE — 1159F MED LIST DOCD IN RCRD: CPT | Performed by: UROLOGY

## 2025-08-28 PROCEDURE — 1160F RVW MEDS BY RX/DR IN RCRD: CPT | Performed by: UROLOGY

## 2025-08-29 ENCOUNTER — OFFICE VISIT (OUTPATIENT)
Dept: ONCOLOGY | Facility: CLINIC | Age: 65
End: 2025-08-29
Payer: MEDICARE

## 2025-08-29 VITALS
SYSTOLIC BLOOD PRESSURE: 122 MMHG | DIASTOLIC BLOOD PRESSURE: 76 MMHG | HEART RATE: 83 BPM | RESPIRATION RATE: 16 BRPM | HEIGHT: 62 IN | TEMPERATURE: 96.4 F | BODY MASS INDEX: 30.57 KG/M2 | WEIGHT: 166.1 LBS | OXYGEN SATURATION: 98 %

## 2025-08-29 DIAGNOSIS — C34.11 MALIGNANT NEOPLASM OF UPPER LOBE OF RIGHT LUNG: Primary | ICD-10-CM

## (undated) DEVICE — VISION PROBE ADAPTER AND SUCTION ADAPTER

## (undated) DEVICE — FRCP BX RADJAW4 PULM WO NDL STD1.8X2 100

## (undated) DEVICE — Device: Brand: BALLOON

## (undated) DEVICE — ADAPT SWVL FIBROPTIC BRONCH

## (undated) DEVICE — Device: Brand: ION

## (undated) DEVICE — SINGLE USE BIOPSY VALVE MAJ-210: Brand: SINGLE USE BIOPSY VALVE (STERILE)

## (undated) DEVICE — TBG SMPL FLTR LINE NASL 02/C02 A/ BX/100

## (undated) DEVICE — SWIVEL CONNECTOR

## (undated) DEVICE — SINGLE USE SUCTION VALVE MAJ-209: Brand: SINGLE USE SUCTION VALVE (STERILE)

## (undated) DEVICE — TRAP,MUCUS SPECIMEN, 80CC: Brand: MEDLINE

## (undated) DEVICE — SENSR O2 OXIMAX FNGR A/ 18IN NONSTR

## (undated) DEVICE — THE CHANNEL CLEANING BRUSH IS A NYLON FLEXI BRUSH ATTACHED TO A FLEXIBLE PLASTIC SHEATH DESIGNED TO SAFELY REMOVE DEBRIS FROM FLEXIBLE ENDOSCOPES.

## (undated) DEVICE — SINGLE USE ASPIRATION NEEDLE: Brand: SINGLE USE ASPIRATION NEEDLE

## (undated) DEVICE — TBG PRESS EXT

## (undated) DEVICE — BIOPSY NEEDLE, 21G: Brand: FLEXISION